# Patient Record
Sex: MALE | Race: WHITE | NOT HISPANIC OR LATINO | Employment: FULL TIME | URBAN - METROPOLITAN AREA
[De-identification: names, ages, dates, MRNs, and addresses within clinical notes are randomized per-mention and may not be internally consistent; named-entity substitution may affect disease eponyms.]

---

## 2023-09-19 PROBLEM — E03.9 HYPOTHYROIDISM: Status: ACTIVE | Noted: 2023-09-19

## 2023-09-19 PROBLEM — G25.81 RLS (RESTLESS LEGS SYNDROME): Status: ACTIVE | Noted: 2023-09-19

## 2023-09-19 PROBLEM — E55.9 VITAMIN D DEFICIENCY: Status: ACTIVE | Noted: 2023-09-19

## 2023-09-19 PROBLEM — G47.33 OSA ON CPAP: Status: ACTIVE | Noted: 2023-09-19

## 2023-09-19 PROBLEM — Z86.73 HISTORY OF CVA (CEREBROVASCULAR ACCIDENT): Status: ACTIVE | Noted: 2023-09-19

## 2023-09-19 PROBLEM — G31.84 MILD COGNITIVE IMPAIRMENT: Status: ACTIVE | Noted: 2023-09-19

## 2023-09-19 PROBLEM — F45.8 BRUXISM (TEETH GRINDING): Status: ACTIVE | Noted: 2023-09-19

## 2023-09-19 PROBLEM — E78.5 HYPERLIPIDEMIA: Status: ACTIVE | Noted: 2023-09-19

## 2023-09-19 PROBLEM — E11.42 TYPE 2 DIABETES MELLITUS WITH DIABETIC POLYNEUROPATHY (MULTI): Status: ACTIVE | Noted: 2023-09-19

## 2023-09-19 PROBLEM — F41.8 DEPRESSION WITH ANXIETY: Status: ACTIVE | Noted: 2023-09-19

## 2023-09-19 PROBLEM — N52.1 ERECTILE DYSFUNCTION DUE TO DISEASES CLASSIFIED ELSEWHERE: Status: ACTIVE | Noted: 2023-09-19

## 2023-09-19 PROBLEM — G47.33 SEVERE OBSTRUCTIVE SLEEP APNEA-HYPOPNEA SYNDROME: Status: ACTIVE | Noted: 2023-09-19

## 2023-09-19 PROBLEM — I10 BENIGN ESSENTIAL HYPERTENSION: Status: ACTIVE | Noted: 2023-09-19

## 2023-09-19 PROBLEM — E78.2 COMBINED HYPERLIPIDEMIA: Status: ACTIVE | Noted: 2023-09-19

## 2023-09-19 PROBLEM — E11.65 TYPE 2 DIABETES MELLITUS WITH HYPERGLYCEMIA (MULTI): Status: ACTIVE | Noted: 2023-09-19

## 2023-09-19 PROBLEM — E66.9 OBESE: Status: ACTIVE | Noted: 2023-09-19

## 2023-09-19 PROBLEM — G47.33 OBSTRUCTIVE SLEEP APNEA (ADULT) (PEDIATRIC): Status: ACTIVE | Noted: 2023-09-19

## 2023-09-19 PROBLEM — E11.42 TYPE 2 DIABETES MELLITUS WITH DIABETIC POLYNEUROPATHY, WITHOUT LONG-TERM CURRENT USE OF INSULIN (MULTI): Status: ACTIVE | Noted: 2023-09-19

## 2023-09-19 PROBLEM — K21.9 GERD WITHOUT ESOPHAGITIS: Status: ACTIVE | Noted: 2023-09-19

## 2023-09-19 PROBLEM — E83.19 OTHER DISORDERS OF IRON METABOLISM: Status: ACTIVE | Noted: 2023-09-19

## 2023-09-19 RX ORDER — METOPROLOL TARTRATE 50 MG/1
1 TABLET ORAL 2 TIMES DAILY
COMMUNITY
Start: 2013-07-22 | End: 2024-02-05 | Stop reason: WASHOUT

## 2023-09-19 RX ORDER — PEN NEEDLE, DIABETIC 30 GX3/16"
NEEDLE, DISPOSABLE MISCELLANEOUS 4 TIMES DAILY
COMMUNITY
Start: 2022-01-14

## 2023-09-19 RX ORDER — INSULIN DEGLUDEC 200 U/ML
50 INJECTION, SOLUTION SUBCUTANEOUS DAILY
COMMUNITY
End: 2024-02-05 | Stop reason: WASHOUT

## 2023-09-19 RX ORDER — SERTRALINE HYDROCHLORIDE 100 MG/1
100 TABLET, FILM COATED ORAL 2 TIMES DAILY
COMMUNITY
End: 2024-02-05 | Stop reason: WASHOUT

## 2023-09-19 RX ORDER — ATORVASTATIN CALCIUM 40 MG/1
40 TABLET, FILM COATED ORAL DAILY
COMMUNITY
End: 2024-02-05 | Stop reason: WASHOUT

## 2023-09-19 RX ORDER — PIOGLITAZONEHYDROCHLORIDE 15 MG/1
15 TABLET ORAL DAILY
COMMUNITY
End: 2024-02-05 | Stop reason: WASHOUT

## 2023-09-19 RX ORDER — LOSARTAN POTASSIUM AND HYDROCHLOROTHIAZIDE 25; 100 MG/1; MG/1
1 TABLET ORAL DAILY
COMMUNITY
Start: 2014-02-17 | End: 2023-11-19 | Stop reason: WASHOUT

## 2023-09-19 RX ORDER — LEVOTHYROXINE SODIUM 200 UG/1
1 TABLET ORAL DAILY
COMMUNITY
Start: 2013-07-22 | End: 2024-02-05 | Stop reason: WASHOUT

## 2023-09-19 RX ORDER — SYRINGE,SAFETY WITH NEEDLE,1ML 25GX1"
SYRINGE (EA) MISCELLANEOUS 2 TIMES DAILY
COMMUNITY
Start: 2014-04-08

## 2023-09-19 RX ORDER — MELOXICAM 15 MG/1
15 TABLET ORAL DAILY
COMMUNITY
End: 2023-11-19 | Stop reason: SDUPTHER

## 2023-09-19 RX ORDER — METOPROLOL TARTRATE 100 MG/1
100 TABLET ORAL 2 TIMES DAILY
COMMUNITY
End: 2024-02-05 | Stop reason: WASHOUT

## 2023-09-19 RX ORDER — SILDENAFIL 100 MG/1
100 TABLET, FILM COATED ORAL DAILY PRN
COMMUNITY
Start: 2023-03-07

## 2023-09-19 RX ORDER — INSULIN ASPART 100 [IU]/ML
INJECTION, SOLUTION INTRAVENOUS; SUBCUTANEOUS DAILY
COMMUNITY
Start: 2022-01-14 | End: 2024-02-05 | Stop reason: WASHOUT

## 2023-09-19 RX ORDER — HUMAN INSULIN 100 [USP'U]/ML
INJECTION, SUSPENSION SUBCUTANEOUS 2 TIMES DAILY
COMMUNITY
Start: 2016-03-31 | End: 2024-02-05 | Stop reason: WASHOUT

## 2023-09-19 RX ORDER — FAMOTIDINE 40 MG/1
40 TABLET, FILM COATED ORAL
COMMUNITY
Start: 2014-03-25 | End: 2024-02-05 | Stop reason: WASHOUT

## 2023-09-19 RX ORDER — FLUTICASONE PROPIONATE 50 MCG
2 SPRAY, SUSPENSION (ML) NASAL DAILY
COMMUNITY
Start: 2015-10-27 | End: 2024-02-05 | Stop reason: WASHOUT

## 2023-09-19 RX ORDER — GABAPENTIN 300 MG/1
1 CAPSULE ORAL NIGHTLY
COMMUNITY
Start: 2015-10-27 | End: 2024-02-05 | Stop reason: WASHOUT

## 2023-09-19 RX ORDER — GABAPENTIN 100 MG/1
200 CAPSULE ORAL 3 TIMES DAILY PRN
COMMUNITY
End: 2024-02-05 | Stop reason: WASHOUT

## 2023-09-19 RX ORDER — LORATADINE 10 MG/1
10 TABLET ORAL
COMMUNITY
Start: 2014-04-02 | End: 2024-02-05 | Stop reason: WASHOUT

## 2023-09-19 RX ORDER — DEXTROSE 4 G
TABLET,CHEWABLE ORAL
COMMUNITY
Start: 2021-02-25 | End: 2024-02-05 | Stop reason: WASHOUT

## 2023-09-19 RX ORDER — AMLODIPINE BESYLATE 10 MG/1
1 TABLET ORAL DAILY
COMMUNITY
End: 2024-02-05 | Stop reason: WASHOUT

## 2023-09-19 RX ORDER — LOSARTAN POTASSIUM 100 MG/1
1 TABLET ORAL DAILY
COMMUNITY
End: 2023-11-19 | Stop reason: SDUPTHER

## 2023-09-19 RX ORDER — OMEPRAZOLE 40 MG/1
40 CAPSULE, DELAYED RELEASE ORAL
COMMUNITY
End: 2024-02-05 | Stop reason: WASHOUT

## 2023-09-19 RX ORDER — LANCETS 33 GAUGE
EACH MISCELLANEOUS
COMMUNITY
Start: 2021-02-25

## 2023-09-19 RX ORDER — HYDROCHLOROTHIAZIDE 25 MG/1
25 TABLET ORAL DAILY
COMMUNITY
End: 2024-02-05 | Stop reason: WASHOUT

## 2023-09-19 RX ORDER — HYDRALAZINE HYDROCHLORIDE 100 MG/1
1 TABLET, FILM COATED ORAL 2 TIMES DAILY
COMMUNITY
End: 2024-02-05 | Stop reason: WASHOUT

## 2023-09-19 RX ORDER — GABAPENTIN 800 MG/1
100 TABLET ORAL 2 TIMES DAILY
COMMUNITY
End: 2024-02-05 | Stop reason: WASHOUT

## 2023-11-02 ENCOUNTER — PHARMACY VISIT (OUTPATIENT)
Dept: PHARMACY | Facility: CLINIC | Age: 63
End: 2023-11-02
Payer: COMMERCIAL

## 2023-11-16 ENCOUNTER — PHARMACY VISIT (OUTPATIENT)
Dept: PHARMACY | Facility: CLINIC | Age: 63
End: 2023-11-16
Payer: COMMERCIAL

## 2023-11-16 DIAGNOSIS — E11.65 TYPE 2 DIABETES MELLITUS WITH HYPERGLYCEMIA, WITH LONG-TERM CURRENT USE OF INSULIN (MULTI): Primary | ICD-10-CM

## 2023-11-16 DIAGNOSIS — Z79.4 TYPE 2 DIABETES MELLITUS WITH HYPERGLYCEMIA, WITH LONG-TERM CURRENT USE OF INSULIN (MULTI): Primary | ICD-10-CM

## 2023-11-16 PROCEDURE — RXMED WILLOW AMBULATORY MEDICATION CHARGE

## 2023-11-17 ENCOUNTER — PHARMACY VISIT (OUTPATIENT)
Dept: PHARMACY | Facility: CLINIC | Age: 63
End: 2023-11-17
Payer: COMMERCIAL

## 2023-11-17 ENCOUNTER — TELEPHONE (OUTPATIENT)
Dept: PRIMARY CARE | Facility: CLINIC | Age: 63
End: 2023-11-17
Payer: MEDICARE

## 2023-11-17 DIAGNOSIS — M15.9 GENERALIZED OSTEOARTHRITIS OF MULTIPLE SITES: ICD-10-CM

## 2023-11-17 DIAGNOSIS — I10 BENIGN ESSENTIAL HYPERTENSION: Primary | ICD-10-CM

## 2023-11-17 PROCEDURE — RXMED WILLOW AMBULATORY MEDICATION CHARGE

## 2023-11-17 RX ORDER — INSULIN ASPART 100 [IU]/ML
INJECTION, SOLUTION INTRAVENOUS; SUBCUTANEOUS
Qty: 45 ML | Refills: 1 | Status: SHIPPED | OUTPATIENT
Start: 2023-11-17 | End: 2024-11-16

## 2023-11-17 NOTE — TELEPHONE ENCOUNTER
Pt called to request RX refills of the following to be sent to [facility] pharmacy:    Meloxicam, 15 mg  Losartan, 100 mg

## 2023-11-19 ENCOUNTER — PHARMACY VISIT (OUTPATIENT)
Dept: PHARMACY | Facility: CLINIC | Age: 63
End: 2023-11-19
Payer: COMMERCIAL

## 2023-11-19 PROBLEM — M15.9 GENERALIZED OSTEOARTHRITIS OF MULTIPLE SITES: Status: ACTIVE | Noted: 2023-11-19

## 2023-11-19 PROCEDURE — RXMED WILLOW AMBULATORY MEDICATION CHARGE

## 2023-11-19 RX ORDER — LOSARTAN POTASSIUM 100 MG/1
100 TABLET ORAL DAILY
Qty: 90 TABLET | Refills: 1 | Status: SHIPPED | OUTPATIENT
Start: 2023-11-19

## 2023-11-19 RX ORDER — MELOXICAM 15 MG/1
15 TABLET ORAL DAILY
Qty: 90 TABLET | Refills: 1 | Status: SHIPPED | OUTPATIENT
Start: 2023-11-19

## 2023-12-18 DIAGNOSIS — E11.65 TYPE 2 DIABETES MELLITUS WITH HYPERGLYCEMIA, WITH LONG-TERM CURRENT USE OF INSULIN (MULTI): Primary | ICD-10-CM

## 2023-12-18 DIAGNOSIS — Z79.4 TYPE 2 DIABETES MELLITUS WITH HYPERGLYCEMIA, WITH LONG-TERM CURRENT USE OF INSULIN (MULTI): Primary | ICD-10-CM

## 2023-12-18 PROCEDURE — RXMED WILLOW AMBULATORY MEDICATION CHARGE

## 2023-12-18 RX ORDER — PIOGLITAZONEHYDROCHLORIDE 15 MG/1
15 TABLET ORAL DAILY
Qty: 90 TABLET | Refills: 1 | Status: SHIPPED | OUTPATIENT
Start: 2023-12-18 | End: 2024-12-17

## 2023-12-21 ENCOUNTER — PHARMACY VISIT (OUTPATIENT)
Dept: PHARMACY | Facility: CLINIC | Age: 63
End: 2023-12-21
Payer: COMMERCIAL

## 2024-01-18 ENCOUNTER — APPOINTMENT (OUTPATIENT)
Dept: ENDOCRINOLOGY | Facility: CLINIC | Age: 64
End: 2024-01-18
Payer: MEDICARE

## 2024-01-26 ENCOUNTER — APPOINTMENT (OUTPATIENT)
Dept: ENDOCRINOLOGY | Facility: CLINIC | Age: 64
End: 2024-01-26
Payer: MEDICARE

## 2024-02-05 ENCOUNTER — OFFICE VISIT (OUTPATIENT)
Dept: ENDOCRINOLOGY | Facility: CLINIC | Age: 64
End: 2024-02-05
Payer: MEDICARE

## 2024-02-05 VITALS
SYSTOLIC BLOOD PRESSURE: 151 MMHG | HEART RATE: 62 BPM | BODY MASS INDEX: 35.65 KG/M2 | WEIGHT: 252 LBS | DIASTOLIC BLOOD PRESSURE: 91 MMHG

## 2024-02-05 DIAGNOSIS — E06.3 HYPOTHYROIDISM DUE TO HASHIMOTO'S THYROIDITIS: ICD-10-CM

## 2024-02-05 DIAGNOSIS — I10 BENIGN ESSENTIAL HYPERTENSION: ICD-10-CM

## 2024-02-05 DIAGNOSIS — E11.65 TYPE 2 DIABETES MELLITUS WITH HYPERGLYCEMIA, WITH LONG-TERM CURRENT USE OF INSULIN (MULTI): Primary | ICD-10-CM

## 2024-02-05 DIAGNOSIS — Z79.4 TYPE 2 DIABETES MELLITUS WITH HYPERGLYCEMIA, WITH LONG-TERM CURRENT USE OF INSULIN (MULTI): Primary | ICD-10-CM

## 2024-02-05 DIAGNOSIS — E03.8 HYPOTHYROIDISM DUE TO HASHIMOTO'S THYROIDITIS: ICD-10-CM

## 2024-02-05 DIAGNOSIS — E78.2 MIXED HYPERLIPIDEMIA: ICD-10-CM

## 2024-02-05 LAB — POC HEMOGLOBIN A1C: 8 % (ref 4.2–6.5)

## 2024-02-05 PROCEDURE — 3077F SYST BP >= 140 MM HG: CPT | Performed by: NURSE PRACTITIONER

## 2024-02-05 PROCEDURE — 1159F MED LIST DOCD IN RCRD: CPT | Performed by: NURSE PRACTITIONER

## 2024-02-05 PROCEDURE — 1126F AMNT PAIN NOTED NONE PRSNT: CPT | Performed by: NURSE PRACTITIONER

## 2024-02-05 PROCEDURE — 83036 HEMOGLOBIN GLYCOSYLATED A1C: CPT | Performed by: NURSE PRACTITIONER

## 2024-02-05 PROCEDURE — 1160F RVW MEDS BY RX/DR IN RCRD: CPT | Performed by: NURSE PRACTITIONER

## 2024-02-05 PROCEDURE — 1036F TOBACCO NON-USER: CPT | Performed by: NURSE PRACTITIONER

## 2024-02-05 PROCEDURE — 99214 OFFICE O/P EST MOD 30 MIN: CPT | Performed by: NURSE PRACTITIONER

## 2024-02-05 PROCEDURE — 4010F ACE/ARB THERAPY RXD/TAKEN: CPT | Performed by: NURSE PRACTITIONER

## 2024-02-05 PROCEDURE — 3080F DIAST BP >= 90 MM HG: CPT | Performed by: NURSE PRACTITIONER

## 2024-02-05 PROCEDURE — 95251 CONT GLUC MNTR ANALYSIS I&R: CPT | Performed by: NURSE PRACTITIONER

## 2024-02-05 ASSESSMENT — PATIENT HEALTH QUESTIONNAIRE - PHQ9
2. FEELING DOWN, DEPRESSED OR HOPELESS: SEVERAL DAYS
1. LITTLE INTEREST OR PLEASURE IN DOING THINGS: NOT AT ALL
2. FEELING DOWN, DEPRESSED OR HOPELESS: NOT AT ALL
SUM OF ALL RESPONSES TO PHQ9 QUESTIONS 1 & 2: 0

## 2024-02-05 ASSESSMENT — PAIN SCALES - GENERAL: PAINLEVEL: 0-NO PAIN

## 2024-02-05 NOTE — PROGRESS NOTES
HPI   Presents for follow up/metabolic management 62 yo with DM Type 2 diagnosed in his 40s. History Hypothyroidism, taking Levothyroxine 200 mcg, RAFAEL, HTN, HLD, CVA. Current A1C 8.0% was 7.8 %. Patient testing sugars 4 times a day. Following carb controlled diet somewhat and know reasonable carb allowances. Patient able to afford their medications. Patient is exercising.         -CGM Lenore 2 with phone christiano. Currently on insulin checking BS at least 4 xs a day adjustments made based on readings/frequent visits to manage.         -INSULIN Tresiba 34 Novolog 5/5/10 ISF 25>125          -Metformin intolerant GLP1 history pancreatitis, SGLT2 no TZD Kush 15mg         -HTN Losartan, HCTZ, Metoprolol, Hydralizine, Amlodipine daily at goal         -STATIN Atorvastatin LDLCALC 55    14 day Lenore report downloaded and attached, time in target 57% no lows avg glucose 172. Waking -150, going into bed mostly in target. Post prandial spikes. Under a lot of stress lately with work. Has been off track with dosing, was out of sensors for a while.        Current Outpatient Medications:     amLODIPine (Norvasc) 10 mg tablet, TAKE 1 TABLET BY MOUTH ONE TIME DAILY, Disp: 90 tablet, Rfl: 1    aspirin (ASPIR-81 ORAL), Take 1 tablet by mouth once daily., Disp: , Rfl:     atorvastatin (Lipitor) 40 mg tablet, TAKE 1 TABLET BY MOUTH ONE TIME DAILY, Disp: 90 tablet, Rfl: 1    blood sugar diagnostic strip, TEST BLOOD SUGAR THREE TIMES A DAY AS DIRECTED, Disp: 300 each, Rfl: 1    blood-glucose meter misc, USE FOUR TIMES A DAY AS DIRECTED., Disp: 1 each, Rfl: 0    FreeStyle Lenore sensor system kit, USE AS DIRECTED EVERY 14 DAYS, Disp: 6 each, Rfl: 1    hydrALAZINE (Apresoline) 100 mg tablet, TAKE 1 TABLET BY MOUTH TWO TIMES A DAY, Disp: 180 tablet, Rfl: 1    hydroCHLOROthiazide (HYDRODiuril) 25 mg tablet, TAKE 1 TABLET BY MOUTH ONE TIME DAILY IN THE MORNING, Disp: 90 tablet, Rfl: 1    insulin aspart (NovoLOG Flexpen U-100 Insulin) 100 unit/mL  "(3 mL) pen, INJECT SUBCUTANEOUSLY BEFORE MEALS UP TO 50 UNITS DAILY, Disp: 45 mL, Rfl: 1    insulin degludec (Tresiba FlexTouch) 200 unit/mL (3 mL) injection, INJECT SUBCUTANEOUSLY UP TO 50 UNITS DAILY AS DIRECTED, Disp: 27 mL, Rfl: 3    insulin syringe-needle U-100 31G X 5/16\" 1 mL syringe, 2 times a day., Disp: , Rfl:     lancets 33 gauge misc, as directed intradermally three times per day for 90 days, Disp: , Rfl:     lancets misc, USE AS DIRECTED THREE TIMES A DAY., Disp: 300 each, Rfl: 1    levothyroxine (Synthroid, Levoxyl) 200 mcg tablet, TAKE 1 TABLET BY MOUTH ONE TIME DAILY IN THE MORNING ON AN EMPTY STOMACH, Disp: 90 tablet, Rfl: 1    losartan (Cozaar) 100 mg tablet, Take 1 tablet (100 mg) by mouth once daily., Disp: 90 tablet, Rfl: 1    meloxicam (Mobic) 15 mg tablet, Take 1 tablet (15 mg) by mouth once daily., Disp: 90 tablet, Rfl: 1    metoprolol tartrate (Lopressor) 100 mg tablet, TAKE 1 TABLET BY MOUTH TWO TIMES A DAY, Disp: 180 tablet, Rfl: 1    omeprazole (PriLOSEC) 40 mg DR capsule, TAKE 1 CAPSULE BY MOUTH ONE TIME DAILY 30 MINUTES BEFORE BREAKFAST, Disp: 90 capsule, Rfl: 1    pen needle, diabetic 32 gauge x 5/32\" needle, 4 times a day. As directed, Disp: , Rfl:     pioglitazone (Actos) 15 mg tablet, TAKE 1 TABLET BY MOUTH ONE TIME DAILY., Disp: 90 tablet, Rfl: 1    sertraline (Zoloft) 100 mg tablet, TAKE 1 TABLET BY MOUTH TWO TIMES A DAY, Disp: 180 tablet, Rfl: 1    sildenafil (Viagra) 100 mg tablet, Take 1 tablet (100 mg) by mouth once daily as needed., Disp: , Rfl:       Allergies as of 02/05/2024 - Reviewed 02/05/2024   Allergen Reaction Noted    Ace inhibitors Cough 09/19/2023    Lisinopril Cough 09/19/2023    Metformin Diarrhea 09/19/2023    Ampicillin Rash and Hives 09/19/2023         Review of Systems   Cardiology: Lightheadedness-denies.  Chest pain-denies.  Leg edema-denies.  Palpitations-denies.  Respiratory: Cough-denies. Shortness of breath-denies.  Wheezing-denies.  Gastroenterology: " Constipation-denies.  Diarrhea-denies.  Heartburn-denies.  Endocrinology: Cold intolerance-denies.  Heat intolerance-denies.  Sweats-denies.  Neurology: Headache-denies.  Tremor-denies.  Neuropathy in extremities-denies.  Psychology: Low energy-denies.  Irritability-denies.  Sleep disturbances-denies.      BP (!) 151/91 (BP Location: Left arm, Patient Position: Sitting)   Pulse 62   Wt 114 kg (252 lb)   BMI 35.65 kg/m²       Labs:  Lab Results   Component Value Date    WBC 8.4 04/04/2023    NRBC 0 04/04/2023    RBC 4.84 04/04/2023    HGB 13.9 04/04/2023    HCT 41.4 04/04/2023     04/04/2023     Lab Results   Component Value Date    CALCIUM 9.9 04/04/2023    AST 19 04/04/2023    ALKPHOS 115 04/04/2023    BILITOT 0.3 04/04/2023    PROT 6.9 04/04/2023    ALBUMIN 4.3 04/04/2023    GLOB 2.6 04/04/2023    AGR 1.7 04/04/2023     04/04/2023    K 3.5 04/04/2023     04/04/2023    CO2 27 04/04/2023    ANIONGAP 11 04/04/2023    BUN 27 (H) 04/04/2023    CREATININE 1.3 04/04/2023    UREACREAUR 20.8 04/04/2023    GLUCOSE 299 (H) 04/04/2023    ALT 21 04/04/2023    EGFR 61 04/04/2023     Lab Results   Component Value Date    CHOL 126 (L) 04/04/2023    TRIG 173 (H) 04/04/2023    HDL 38 (L) 04/04/2023    LDLCALC 53 (L) 04/04/2023     Lab Results   Component Value Date    TSH 2.50 04/04/2023     Lab Results   Component Value Date    HGBA1C 8.0 (A) 02/05/2024         Physical Exam   General Appearance: pleasant, cooperative, no acute distress  HEENT: no chemosis, no proptosis, no lid lag, no lid retraction  Neck: no lymphadenopathy, no thyromegaly, no dominant thyroid nodules  Heart: no murmurs, regular rate and rhythm, S1 and S2  Lungs: no wheezes, no rhonci, no rales  Extremities: no lower extremity swelling      Assessment/Plan   1. Type 2 diabetes mellitus with hyperglycemia, with long-term current use of insulin (CMS/[facility])  -work/life stressors not watching carbs   -was out of sensors for a while  -reviewed  target bs  -reviewed recommended carbs per meal/snack    - POCT glycosylated hemoglobin (Hb A1C) manually resulted    2. Mixed hyperlipidemia  -at target     3. Hypothyroidism due to Hashimoto's thyroiditis  -euthyroid  -no compressive/obstructive neck complaints    4. Benign essential hypertension  -above target  -did not take meds  -will check and call office if not 140/80 or below     Follow Up: 3 months CNP      -labs/tests/notes reviewed  -reviewed and counseled patient on medication monitoring and side effects  Medical Decision Making  Complexity of problem: Chronic illness of diabetes mellitus uncontrolled, progressing  Data analyzed and reviewed: Reviewed prior notes, blood glucose data, labs including HgbA1c, lipids, serum chemistries.  Ordered tests.   Risk of complications and morbidities: Is definite because of use of insulin and risk of hypoglycemia.  Prescription medications reviewed and modifications made.  Compliance assessed.  Addressed social determinants of health including food insecurity.

## 2024-02-05 NOTE — PATIENT INSTRUCTIONS
BP TARGET 140/80 OR BELOW   Target blood sugars are 100-130 when waking, and under 180 two hours after a meal and before bedtime.     Increase long acting insulin by 2 units every 3 days for morning blood sugar 100-130

## 2024-02-12 ENCOUNTER — PHARMACY VISIT (OUTPATIENT)
Dept: PHARMACY | Facility: CLINIC | Age: 64
End: 2024-02-12
Payer: COMMERCIAL

## 2024-02-12 DIAGNOSIS — E78.2 COMBINED HYPERLIPIDEMIA: Primary | ICD-10-CM

## 2024-02-12 PROCEDURE — RXMED WILLOW AMBULATORY MEDICATION CHARGE

## 2024-02-12 RX ORDER — ATORVASTATIN CALCIUM 40 MG/1
40 TABLET, FILM COATED ORAL DAILY
Qty: 90 TABLET | Refills: 1 | Status: SHIPPED | OUTPATIENT
Start: 2024-02-12 | End: 2025-02-11

## 2024-02-20 ENCOUNTER — TELEPHONE (OUTPATIENT)
Dept: ENDOCRINOLOGY | Facility: CLINIC | Age: 64
End: 2024-02-20
Payer: MEDICARE

## 2024-02-20 NOTE — TELEPHONE ENCOUNTER
Malcom MICHELLE Namita   1960   [id number]   [phone]        MA called patient twice to cancel and reschedule 5/6/24 appt due to provider is not available. A letter was mailed out to patient to call office to reschedule appt.

## 2024-03-13 DIAGNOSIS — I10 BENIGN ESSENTIAL HYPERTENSION: Primary | ICD-10-CM

## 2024-03-13 DIAGNOSIS — K21.9 GERD WITHOUT ESOPHAGITIS: ICD-10-CM

## 2024-03-13 DIAGNOSIS — F41.8 DEPRESSION WITH ANXIETY: ICD-10-CM

## 2024-03-13 DIAGNOSIS — E03.9 HYPOTHYROIDISM, UNSPECIFIED TYPE: ICD-10-CM

## 2024-03-13 PROCEDURE — RXMED WILLOW AMBULATORY MEDICATION CHARGE

## 2024-03-13 RX ORDER — HYDRALAZINE HYDROCHLORIDE 100 MG/1
100 TABLET, FILM COATED ORAL 2 TIMES DAILY
Qty: 180 TABLET | Refills: 1 | Status: SHIPPED | OUTPATIENT
Start: 2024-03-13 | End: 2025-03-13

## 2024-03-13 RX ORDER — LEVOTHYROXINE SODIUM 200 UG/1
TABLET ORAL
Qty: 90 TABLET | Refills: 1 | Status: SHIPPED | OUTPATIENT
Start: 2024-03-13 | End: 2025-03-13

## 2024-03-13 RX ORDER — SERTRALINE HYDROCHLORIDE 100 MG/1
100 TABLET, FILM COATED ORAL 2 TIMES DAILY
Qty: 180 TABLET | Refills: 1 | Status: SHIPPED | OUTPATIENT
Start: 2024-03-13 | End: 2025-03-13

## 2024-03-13 RX ORDER — METOPROLOL TARTRATE 100 MG/1
100 TABLET ORAL 2 TIMES DAILY
Qty: 180 TABLET | Refills: 1 | Status: SHIPPED | OUTPATIENT
Start: 2024-03-13 | End: 2025-03-13

## 2024-03-13 RX ORDER — HYDROCHLOROTHIAZIDE 25 MG/1
25 TABLET ORAL EVERY MORNING
Qty: 90 TABLET | Refills: 1 | Status: SHIPPED | OUTPATIENT
Start: 2024-03-13 | End: 2025-03-13

## 2024-03-13 RX ORDER — OMEPRAZOLE 40 MG/1
CAPSULE, DELAYED RELEASE ORAL
Qty: 90 CAPSULE | Refills: 1 | Status: SHIPPED | OUTPATIENT
Start: 2024-03-13 | End: 2025-03-13

## 2024-03-22 ENCOUNTER — PHARMACY VISIT (OUTPATIENT)
Dept: PHARMACY | Facility: CLINIC | Age: 64
End: 2024-03-22
Payer: COMMERCIAL

## 2024-04-05 ENCOUNTER — APPOINTMENT (OUTPATIENT)
Dept: PRIMARY CARE | Facility: CLINIC | Age: 64
End: 2024-04-05
Payer: MEDICARE

## 2024-04-10 ENCOUNTER — LAB (OUTPATIENT)
Dept: LAB | Facility: LAB | Age: 64
End: 2024-04-10
Payer: MEDICARE

## 2024-04-10 DIAGNOSIS — Z86.73 PERSONAL HISTORY OF TRANSIENT ISCHEMIC ATTACK (TIA), AND CEREBRAL INFARCTION WITHOUT RESIDUAL DEFICITS: ICD-10-CM

## 2024-04-10 DIAGNOSIS — Z00.00 ENCOUNTER FOR GENERAL ADULT MEDICAL EXAMINATION WITHOUT ABNORMAL FINDINGS: Primary | ICD-10-CM

## 2024-04-10 DIAGNOSIS — E03.9 HYPOTHYROIDISM, UNSPECIFIED: ICD-10-CM

## 2024-04-10 DIAGNOSIS — E11.65 TYPE 2 DIABETES MELLITUS WITH HYPERGLYCEMIA (MULTI): ICD-10-CM

## 2024-04-10 DIAGNOSIS — Z79.4 LONG TERM (CURRENT) USE OF INSULIN (MULTI): ICD-10-CM

## 2024-04-10 DIAGNOSIS — I10 ESSENTIAL (PRIMARY) HYPERTENSION: ICD-10-CM

## 2024-04-10 LAB
ALBUMIN SERPL BCP-MCNC: 4.4 G/DL (ref 3.4–5)
ALP SERPL-CCNC: 99 U/L (ref 33–136)
ALT SERPL W P-5'-P-CCNC: 13 U/L (ref 10–52)
ANION GAP SERPL CALC-SCNC: 13 MMOL/L (ref 10–20)
AST SERPL W P-5'-P-CCNC: 16 U/L (ref 9–39)
BASOPHILS # BLD AUTO: 0.05 X10*3/UL (ref 0–0.1)
BASOPHILS NFR BLD AUTO: 0.6 %
BILIRUB SERPL-MCNC: 0.5 MG/DL (ref 0–1.2)
BUN SERPL-MCNC: 31 MG/DL (ref 6–23)
CALCIUM SERPL-MCNC: 10.7 MG/DL (ref 8.6–10.3)
CHLORIDE SERPL-SCNC: 103 MMOL/L (ref 98–107)
CHOLEST SERPL-MCNC: 128 MG/DL (ref 0–199)
CHOLESTEROL/HDL RATIO: 3.4
CO2 SERPL-SCNC: 29 MMOL/L (ref 21–32)
CREAT SERPL-MCNC: 1.65 MG/DL (ref 0.5–1.3)
EGFRCR SERPLBLD CKD-EPI 2021: 46 ML/MIN/1.73M*2
EOSINOPHIL # BLD AUTO: 0.25 X10*3/UL (ref 0–0.7)
EOSINOPHIL NFR BLD AUTO: 3.1 %
ERYTHROCYTE [DISTWIDTH] IN BLOOD BY AUTOMATED COUNT: 13.3 % (ref 11.5–14.5)
GLUCOSE SERPL-MCNC: 69 MG/DL (ref 74–99)
HCT VFR BLD AUTO: 42 % (ref 41–52)
HDLC SERPL-MCNC: 37.7 MG/DL
HGB BLD-MCNC: 13.7 G/DL (ref 13.5–17.5)
IMM GRANULOCYTES # BLD AUTO: 0.03 X10*3/UL (ref 0–0.7)
IMM GRANULOCYTES NFR BLD AUTO: 0.4 % (ref 0–0.9)
LDLC SERPL CALC-MCNC: 68 MG/DL
LYMPHOCYTES # BLD AUTO: 1.93 X10*3/UL (ref 1.2–4.8)
LYMPHOCYTES NFR BLD AUTO: 23.8 %
MCH RBC QN AUTO: 28.3 PG (ref 26–34)
MCHC RBC AUTO-ENTMCNC: 32.6 G/DL (ref 32–36)
MCV RBC AUTO: 87 FL (ref 80–100)
MONOCYTES # BLD AUTO: 0.6 X10*3/UL (ref 0.1–1)
MONOCYTES NFR BLD AUTO: 7.4 %
NEUTROPHILS # BLD AUTO: 5.26 X10*3/UL (ref 1.2–7.7)
NEUTROPHILS NFR BLD AUTO: 64.7 %
NON HDL CHOLESTEROL: 90 MG/DL (ref 0–149)
NRBC BLD-RTO: 0 /100 WBCS (ref 0–0)
PLATELET # BLD AUTO: 242 X10*3/UL (ref 150–450)
POTASSIUM SERPL-SCNC: 4.1 MMOL/L (ref 3.5–5.3)
PROT SERPL-MCNC: 6.8 G/DL (ref 6.4–8.2)
PSA SERPL-MCNC: 0.45 NG/ML
RBC # BLD AUTO: 4.84 X10*6/UL (ref 4.5–5.9)
SODIUM SERPL-SCNC: 141 MMOL/L (ref 136–145)
T4 FREE SERPL-MCNC: 0.91 NG/DL (ref 0.61–1.12)
TRIGL SERPL-MCNC: 112 MG/DL (ref 0–149)
TSH SERPL-ACNC: 6.31 MIU/L (ref 0.44–3.98)
VLDL: 22 MG/DL (ref 0–40)
WBC # BLD AUTO: 8.1 X10*3/UL (ref 4.4–11.3)

## 2024-04-10 PROCEDURE — 84153 ASSAY OF PSA TOTAL: CPT

## 2024-04-10 PROCEDURE — 36415 COLL VENOUS BLD VENIPUNCTURE: CPT

## 2024-04-11 ENCOUNTER — APPOINTMENT (OUTPATIENT)
Dept: PRIMARY CARE | Facility: CLINIC | Age: 64
End: 2024-04-11
Payer: MEDICARE

## 2024-04-11 DIAGNOSIS — I10 BENIGN ESSENTIAL HYPERTENSION: ICD-10-CM

## 2024-04-11 DIAGNOSIS — N17.9 ACUTE RENAL INJURY (CMS-HCC): ICD-10-CM

## 2024-04-11 DIAGNOSIS — R79.89 ELEVATED TSH: Primary | ICD-10-CM

## 2024-04-26 PROCEDURE — RXMED WILLOW AMBULATORY MEDICATION CHARGE

## 2024-04-29 ENCOUNTER — PHARMACY VISIT (OUTPATIENT)
Dept: PHARMACY | Facility: CLINIC | Age: 64
End: 2024-04-29
Payer: COMMERCIAL

## 2024-05-06 ENCOUNTER — APPOINTMENT (OUTPATIENT)
Dept: ENDOCRINOLOGY | Facility: CLINIC | Age: 64
End: 2024-05-06
Payer: MEDICARE

## 2024-05-10 ENCOUNTER — LAB (OUTPATIENT)
Dept: LAB | Facility: LAB | Age: 64
End: 2024-05-10
Payer: MEDICARE

## 2024-05-10 DIAGNOSIS — N17.9 ACUTE RENAL INJURY (CMS-HCC): ICD-10-CM

## 2024-05-10 DIAGNOSIS — I10 BENIGN ESSENTIAL HYPERTENSION: ICD-10-CM

## 2024-05-10 DIAGNOSIS — R79.89 ELEVATED TSH: ICD-10-CM

## 2024-05-10 LAB
ANION GAP SERPL CALC-SCNC: 14 MMOL/L (ref 10–20)
BUN SERPL-MCNC: 31 MG/DL (ref 6–23)
CALCIUM SERPL-MCNC: 10.2 MG/DL (ref 8.6–10.3)
CHLORIDE SERPL-SCNC: 102 MMOL/L (ref 98–107)
CO2 SERPL-SCNC: 28 MMOL/L (ref 21–32)
CREAT SERPL-MCNC: 1.16 MG/DL (ref 0.5–1.3)
EGFRCR SERPLBLD CKD-EPI 2021: 69 ML/MIN/1.73M*2
GLUCOSE SERPL-MCNC: 144 MG/DL (ref 74–99)
POTASSIUM SERPL-SCNC: 3.9 MMOL/L (ref 3.5–5.3)
SODIUM SERPL-SCNC: 140 MMOL/L (ref 136–145)
TSH SERPL-ACNC: 3.29 MIU/L (ref 0.44–3.98)

## 2024-05-10 PROCEDURE — 36415 COLL VENOUS BLD VENIPUNCTURE: CPT

## 2024-05-11 PROCEDURE — RXMED WILLOW AMBULATORY MEDICATION CHARGE

## 2024-05-13 ENCOUNTER — PHARMACY VISIT (OUTPATIENT)
Dept: PHARMACY | Facility: CLINIC | Age: 64
End: 2024-05-13
Payer: COMMERCIAL

## 2024-05-16 PROBLEM — E78.5 HYPERLIPIDEMIA: Status: RESOLVED | Noted: 2023-09-19 | Resolved: 2024-05-16

## 2024-05-17 ENCOUNTER — OFFICE VISIT (OUTPATIENT)
Dept: PRIMARY CARE | Facility: CLINIC | Age: 64
End: 2024-05-17
Payer: MEDICARE

## 2024-05-17 VITALS
BODY MASS INDEX: 35.68 KG/M2 | OXYGEN SATURATION: 98 % | WEIGHT: 252.2 LBS | DIASTOLIC BLOOD PRESSURE: 78 MMHG | HEART RATE: 78 BPM | SYSTOLIC BLOOD PRESSURE: 130 MMHG

## 2024-05-17 DIAGNOSIS — M54.41 ACUTE RIGHT-SIDED LOW BACK PAIN WITH RIGHT-SIDED SCIATICA: ICD-10-CM

## 2024-05-17 DIAGNOSIS — I10 BENIGN ESSENTIAL HYPERTENSION: ICD-10-CM

## 2024-05-17 DIAGNOSIS — Z79.4 TYPE 2 DIABETES MELLITUS WITH HYPERGLYCEMIA, WITH LONG-TERM CURRENT USE OF INSULIN (MULTI): Primary | ICD-10-CM

## 2024-05-17 DIAGNOSIS — E11.42 TYPE 2 DIABETES MELLITUS WITH DIABETIC POLYNEUROPATHY, WITHOUT LONG-TERM CURRENT USE OF INSULIN (MULTI): ICD-10-CM

## 2024-05-17 DIAGNOSIS — E11.65 TYPE 2 DIABETES MELLITUS WITH HYPERGLYCEMIA, WITH LONG-TERM CURRENT USE OF INSULIN (MULTI): Primary | ICD-10-CM

## 2024-05-17 DIAGNOSIS — E66.01 OBESITY, MORBID (MULTI): ICD-10-CM

## 2024-05-17 PROCEDURE — 3078F DIAST BP <80 MM HG: CPT | Performed by: FAMILY MEDICINE

## 2024-05-17 PROCEDURE — 99214 OFFICE O/P EST MOD 30 MIN: CPT | Performed by: FAMILY MEDICINE

## 2024-05-17 PROCEDURE — 3048F LDL-C <100 MG/DL: CPT | Performed by: FAMILY MEDICINE

## 2024-05-17 PROCEDURE — 3075F SYST BP GE 130 - 139MM HG: CPT | Performed by: FAMILY MEDICINE

## 2024-05-17 PROCEDURE — 1160F RVW MEDS BY RX/DR IN RCRD: CPT | Performed by: FAMILY MEDICINE

## 2024-05-17 PROCEDURE — 4010F ACE/ARB THERAPY RXD/TAKEN: CPT | Performed by: FAMILY MEDICINE

## 2024-05-17 PROCEDURE — 1036F TOBACCO NON-USER: CPT | Performed by: FAMILY MEDICINE

## 2024-05-17 PROCEDURE — 1126F AMNT PAIN NOTED NONE PRSNT: CPT | Performed by: FAMILY MEDICINE

## 2024-05-17 PROCEDURE — 1159F MED LIST DOCD IN RCRD: CPT | Performed by: FAMILY MEDICINE

## 2024-05-17 ASSESSMENT — PATIENT HEALTH QUESTIONNAIRE - PHQ9
2. FEELING DOWN, DEPRESSED OR HOPELESS: NOT AT ALL
1. LITTLE INTEREST OR PLEASURE IN DOING THINGS: NOT AT ALL
SUM OF ALL RESPONSES TO PHQ9 QUESTIONS 1 AND 2: 0

## 2024-05-17 ASSESSMENT — PAIN SCALES - GENERAL: PAINLEVEL: 0-NO PAIN

## 2024-05-17 NOTE — PROGRESS NOTES
Subjective   Patient ID: Malcom Breaux is a 64 y.o. male who presents for Follow-up.    Here for follow up.      Pt is having low back pain.  Worse with walking.  Symptoms for a  few months.  Radiates to right knee.  No injury.  Getting a burning feeling in leg.  No loss of bowel or bladder control.      For DM2:  -A1c: 8  -Follow up:  Wearing freestyle laura 2 - using tresiba and novolog.    -Hypoglycemic Episodes: once a week.  Pt has alarms for hypoglycemia  -Glucose monitoring:  Checking frequently    Hypertension  -Patient is here for follow-up of elevated blood pressure.   -Blood pressure is well controlled.  -Cardiac symptoms: none.   -Patient denies dyspnea and irregular heart beat.   -Cardiologist:           Review of Systems    Objective   /78 (BP Location: Right arm)   Pulse 78   Wt 114 kg (252 lb 3.2 oz)   SpO2 98%   BMI 35.68 kg/m²     Physical Exam  Vitals reviewed.   Constitutional:       General: He is not in acute distress.  Cardiovascular:      Rate and Rhythm: Normal rate and regular rhythm.   Pulmonary:      Effort: Pulmonary effort is normal.      Breath sounds: No wheezing or rhonchi.   Musculoskeletal:      Right lower leg: No edema.      Left lower leg: No edema.      Comments: Lumbar Spine:  -Inspection: normal  -Palpation:   no vertebral spine tenderness;   bilateral paraspinal tenderness  no SI tenderness  -DTR:  Right Patellar: 2/4  Left Patellar: 2/4  Right Achilles: 2/4  Left Achilles: 2/4  -Strength   Hip Flexion: 5/5 bilaterally   Hip Abduction: 5/5 bilaterally   Knee Extension: 5/5 bilaterally   Great Toe Dorsiflex: 5/5 bilaterally   Ankle Plantarflexion: 5/5 bilaterally  -SLR:    Right Leg: mild positive   Left Leg: neg        Lymphadenopathy:      Cervical: No cervical adenopathy.   Neurological:      Mental Status: He is alert.     Colon Cancer Screening Refusal.    -Patient refuses colon cancer screening including FIT testing, Cologuard and colonoscopy. Is aware of the  implications of refusal including missed early detection of colorectal cancer which could lead to increased morbidity and mortality.     Assessment/Plan   Diagnoses and all orders for this visit:  Type 2 diabetes mellitus with hyperglycemia, with long-term current use of insulin (Multi)  Benign essential hypertension  Obesity, morbid (Multi)  Type 2 diabetes mellitus with diabetic polyneuropathy, without long-term current use of insulin (Multi)  Acute right-sided low back pain with right-sided sciatica    Patient Instructions   For DM2 - A1c is 8.  Continue with endocrinology.  Continue tresiba and novolog.      For blood pressure - well controlled on current regimen.     For low back pain - likley arthritis, sciatica.  Recommend tylenol/ibuprofen (do not take with meloxicam), heat/ice.  If not improving please call for xray and therapy.      Follow up in 6 months.

## 2024-05-17 NOTE — PATIENT INSTRUCTIONS
For DM2 - A1c is 8.  Continue with endocrinology.  Continue tresiba and novolog.      For blood pressure - well controlled on current regimen.     For low back pain - likley arthritis, sciatica.  Recommend tylenol/ibuprofen (do not take with meloxicam), heat/ice.  If not improving please call for xray and therapy.      Follow up in 6 months.

## 2024-05-30 PROCEDURE — RXMED WILLOW AMBULATORY MEDICATION CHARGE

## 2024-06-03 ENCOUNTER — PHARMACY VISIT (OUTPATIENT)
Dept: PHARMACY | Facility: CLINIC | Age: 64
End: 2024-06-03
Payer: COMMERCIAL

## 2024-06-11 ENCOUNTER — OFFICE VISIT (OUTPATIENT)
Dept: ENDOCRINOLOGY | Facility: CLINIC | Age: 64
End: 2024-06-11
Payer: MEDICARE

## 2024-06-11 VITALS
SYSTOLIC BLOOD PRESSURE: 150 MMHG | DIASTOLIC BLOOD PRESSURE: 79 MMHG | HEART RATE: 61 BPM | BODY MASS INDEX: 35.48 KG/M2 | WEIGHT: 250.8 LBS

## 2024-06-11 DIAGNOSIS — I10 BENIGN ESSENTIAL HYPERTENSION: ICD-10-CM

## 2024-06-11 DIAGNOSIS — E78.2 MIXED HYPERLIPIDEMIA: ICD-10-CM

## 2024-06-11 DIAGNOSIS — E11.65 TYPE 2 DIABETES MELLITUS WITH HYPERGLYCEMIA, WITH LONG-TERM CURRENT USE OF INSULIN (MULTI): Primary | ICD-10-CM

## 2024-06-11 DIAGNOSIS — E06.3 HYPOTHYROIDISM DUE TO HASHIMOTO'S THYROIDITIS: ICD-10-CM

## 2024-06-11 DIAGNOSIS — Z79.4 TYPE 2 DIABETES MELLITUS WITH HYPERGLYCEMIA, WITH LONG-TERM CURRENT USE OF INSULIN (MULTI): Primary | ICD-10-CM

## 2024-06-11 DIAGNOSIS — E03.8 HYPOTHYROIDISM DUE TO HASHIMOTO'S THYROIDITIS: ICD-10-CM

## 2024-06-11 LAB — POC HEMOGLOBIN A1C: 7.8 % (ref 4.2–6.5)

## 2024-06-11 PROCEDURE — 3048F LDL-C <100 MG/DL: CPT | Performed by: NURSE PRACTITIONER

## 2024-06-11 PROCEDURE — 3077F SYST BP >= 140 MM HG: CPT | Performed by: NURSE PRACTITIONER

## 2024-06-11 PROCEDURE — 99213 OFFICE O/P EST LOW 20 MIN: CPT | Performed by: NURSE PRACTITIONER

## 2024-06-11 PROCEDURE — 83036 HEMOGLOBIN GLYCOSYLATED A1C: CPT | Performed by: NURSE PRACTITIONER

## 2024-06-11 PROCEDURE — 4010F ACE/ARB THERAPY RXD/TAKEN: CPT | Performed by: NURSE PRACTITIONER

## 2024-06-11 PROCEDURE — 1036F TOBACCO NON-USER: CPT | Performed by: NURSE PRACTITIONER

## 2024-06-11 PROCEDURE — 1159F MED LIST DOCD IN RCRD: CPT | Performed by: NURSE PRACTITIONER

## 2024-06-11 PROCEDURE — 1125F AMNT PAIN NOTED PAIN PRSNT: CPT | Performed by: NURSE PRACTITIONER

## 2024-06-11 PROCEDURE — 3078F DIAST BP <80 MM HG: CPT | Performed by: NURSE PRACTITIONER

## 2024-06-11 ASSESSMENT — PAIN SCALES - GENERAL: PAINLEVEL: 2

## 2024-06-11 ASSESSMENT — PATIENT HEALTH QUESTIONNAIRE - PHQ9
2. FEELING DOWN, DEPRESSED OR HOPELESS: SEVERAL DAYS
10. IF YOU CHECKED OFF ANY PROBLEMS, HOW DIFFICULT HAVE THESE PROBLEMS MADE IT FOR YOU TO DO YOUR WORK, TAKE CARE OF THINGS AT HOME, OR GET ALONG WITH OTHER PEOPLE: SOMEWHAT DIFFICULT
1. LITTLE INTEREST OR PLEASURE IN DOING THINGS: SEVERAL DAYS
SUM OF ALL RESPONSES TO PHQ9 QUESTIONS 1 AND 2: 2

## 2024-06-11 ASSESSMENT — ENCOUNTER SYMPTOMS: DEPRESSION: 1

## 2024-06-11 NOTE — PROGRESS NOTES
HPI   Presents for follow up/metabolic management 65 yo with DM Type 2 diagnosed in his 40s. History Hypothyroidism, taking Levothyroxine 200 mcg, RAFAEL, HTN, HLD, CVA. Current A1C 7.8% was 8.0%. Patient testing sugars 4 times a day. One low (62) post prandial. Following carb controlled diet somewhat and know reasonable carb allowances. Patient able to afford their medications. Patient is exercising.         -CGM Lenore 2 with phone christiano. Currently on insulin checking BS at least 4 xs a day adjustments made based on readings/frequent visits to manage.         -INSULIN Tresiba 34 Novolog 5/5/10 ISF 25>125          -Metformin intolerant GLP1 history pancreatitis, SGLT2 no TZD Kush 15mg         -HTN Losartan, HCTZ, Metoprolol, Hydralizine, Amlodipine daily at goal         -STATIN Atorvastatin LDLCALC 55    14 day Lenore report downloaded and attached, time in target 69% no lows avg glucose 162. Waking -150, going into bed mostly in target. Post prandial spikes. Under a lot of stress lately with work. Has been off track with dosing, was out of sensors for a while.        Current Outpatient Medications:     amLODIPine (Norvasc) 10 mg tablet, TAKE 1 TABLET BY MOUTH ONE TIME DAILY, Disp: 90 tablet, Rfl: 1    aspirin (ASPIR-81 ORAL), Take 1 tablet by mouth once daily., Disp: , Rfl:     atorvastatin (Lipitor) 40 mg tablet, TAKE 1 TABLET BY MOUTH ONE TIME DAILY, Disp: 90 tablet, Rfl: 1    blood sugar diagnostic strip, TEST BLOOD SUGAR THREE TIMES A DAY AS DIRECTED, Disp: 300 each, Rfl: 1    blood-glucose meter misc, USE FOUR TIMES A DAY AS DIRECTED., Disp: 1 each, Rfl: 0    FreeStyle Lenore sensor system kit, USE AS DIRECTED EVERY 14 DAYS, Disp: 6 each, Rfl: 1    hydrALAZINE (Apresoline) 100 mg tablet, TAKE 1 TABLET BY MOUTH TWO TIMES A DAY, Disp: 180 tablet, Rfl: 1    hydroCHLOROthiazide (HYDRODiuril) 25 mg tablet, TAKE 1 TABLET BY MOUTH ONE TIME DAILY IN THE MORNING, Disp: 90 tablet, Rfl: 1    insulin aspart (NovoLOG Flexpen  "U-100 Insulin) 100 unit/mL (3 mL) pen, INJECT SUBCUTANEOUSLY BEFORE MEALS UP TO 50 UNITS DAILY, Disp: 45 mL, Rfl: 1    insulin degludec (Tresiba FlexTouch) 200 unit/mL (3 mL) injection, INJECT SUBCUTANEOUSLY UP TO 50 UNITS DAILY AS DIRECTED, Disp: 27 mL, Rfl: 3    insulin syringe-needle U-100 31G X 5/16\" 1 mL syringe, 2 times a day., Disp: , Rfl:     lancets 33 gauge misc, as directed intradermally three times per day for 90 days, Disp: , Rfl:     lancets misc, USE AS DIRECTED THREE TIMES A DAY., Disp: 300 each, Rfl: 1    levothyroxine (Synthroid, Levoxyl) 200 mcg tablet, TAKE 1 TABLET BY MOUTH ONE TIME DAILY IN THE MORNING ON AN EMPTY STOMACH, Disp: 90 tablet, Rfl: 1    losartan (Cozaar) 100 mg tablet, Take 1 tablet (100 mg) by mouth once daily., Disp: 90 tablet, Rfl: 1    meloxicam (Mobic) 15 mg tablet, Take 1 tablet (15 mg) by mouth once daily., Disp: 90 tablet, Rfl: 1    metoprolol tartrate (Lopressor) 100 mg tablet, TAKE 1 TABLET BY MOUTH TWO TIMES A DAY, Disp: 180 tablet, Rfl: 1    omeprazole (PriLOSEC) 40 mg DR capsule, TAKE 1 CAPSULE BY MOUTH ONE TIME DAILY 30 MINUTES BEFORE BREAKFAST, Disp: 90 capsule, Rfl: 1    pen needle, diabetic 32 gauge x 5/32\" needle, 4 times a day. As directed, Disp: , Rfl:     pioglitazone (Actos) 15 mg tablet, TAKE 1 TABLET BY MOUTH ONE TIME DAILY., Disp: 90 tablet, Rfl: 1    sertraline (Zoloft) 100 mg tablet, TAKE 1 TABLET BY MOUTH TWO TIMES A DAY, Disp: 180 tablet, Rfl: 1    sildenafil (Viagra) 100 mg tablet, Take 1 tablet (100 mg) by mouth once daily as needed., Disp: , Rfl:       Allergies as of 06/11/2024 - Reviewed 06/11/2024   Allergen Reaction Noted    Ace inhibitors Cough 09/19/2023    Lisinopril Cough 09/19/2023    Metformin Diarrhea 09/19/2023    Ampicillin Rash and Hives 09/19/2023         Review of Systems   Cardiology: Lightheadedness-denies.  Chest pain-denies.  Leg edema-denies.  Palpitations-denies.  Respiratory: Cough-denies. Shortness of breath-denies.  " Wheezing-denies.  Gastroenterology: Constipation-denies.  Diarrhea-denies.  Heartburn-denies.  Endocrinology: Cold intolerance-denies.  Heat intolerance-denies.  Sweats-denies.  Neurology: Headache-denies.  Tremor-denies.  Neuropathy in extremities-denies.  Psychology: Low energy-denies.  Irritability-denies.  Sleep disturbances-denies.      /79 (BP Location: Left arm, Patient Position: Sitting)   Pulse 61   Wt 114 kg (250 lb 12.8 oz)   BMI 35.48 kg/m²       Labs:  Lab Results   Component Value Date    WBC 8.1 04/10/2024    NRBC 0.0 04/10/2024    RBC 4.84 04/10/2024    HGB 13.7 04/10/2024    HCT 42.0 04/10/2024     04/10/2024     Lab Results   Component Value Date    CALCIUM 10.2 05/10/2024    AST 16 04/10/2024    ALKPHOS 99 04/10/2024    BILITOT 0.5 04/10/2024    PROT 6.8 04/10/2024    ALBUMIN 4.4 04/10/2024    GLOB 2.6 04/04/2023    AGR 1.7 04/04/2023     05/10/2024    K 3.9 05/10/2024     05/10/2024    CO2 28 05/10/2024    ANIONGAP 14 05/10/2024    BUN 31 (H) 05/10/2024    CREATININE 1.16 05/10/2024    UREACREAUR 20.8 04/04/2023    GLUCOSE 144 (H) 05/10/2024    ALT 13 04/10/2024    EGFR 69 05/10/2024     Lab Results   Component Value Date    CHOL 128 04/10/2024    TRIG 112 04/10/2024    HDL 37.7 04/10/2024    LDLCALC 68 04/10/2024     Lab Results   Component Value Date    TSH 3.29 05/10/2024     Lab Results   Component Value Date    HGBA1C 7.8 (A) 06/11/2024         Physical Exam   General Appearance: pleasant, cooperative, no acute distress  HEENT: no chemosis, no proptosis, no lid lag, no lid retraction  Neck: no lymphadenopathy, no thyromegaly, no dominant thyroid nodules  Heart: no murmurs, regular rate and rhythm, S1 and S2  Lungs: no wheezes, no rhonci, no rales  Extremities: no lower extremity swelling      Assessment/Plan   1. Type 2 diabetes mellitus with hyperglycemia, with long-term current use of insulin (CMS/[facility])  -reviewed target bs  -reviewed recommended carbs per  meal/snack  -no changes in medications  -has a dog now, will be walking daily    - POCT glycosylated hemoglobin (Hb A1C) manually resulted    2. Mixed hyperlipidemia  -at target     3. Hypothyroidism due to Hashimoto's thyroiditis  -euthyroid  -no compressive/obstructive neck complaints    4. Benign essential hypertension  -stable     Follow Up: 4 months CNP      -labs/tests/notes reviewed  -reviewed and counseled patient on medication monitoring and side effects  Medical Decision Making  Complexity of problem: Chronic illness of diabetes mellitus uncontrolled, progressing  Data analyzed and reviewed: Reviewed prior notes, blood glucose data, labs including HgbA1c, lipids, serum chemistries.  Ordered tests.   Risk of complications and morbidities: Is definite because of use of insulin and risk of hypoglycemia.  Prescription medications reviewed and modifications made.  Compliance assessed.  Addressed social determinants of health including food insecurity.

## 2024-06-13 ENCOUNTER — PHARMACY VISIT (OUTPATIENT)
Dept: PHARMACY | Facility: CLINIC | Age: 64
End: 2024-06-13
Payer: COMMERCIAL

## 2024-06-13 DIAGNOSIS — Z79.4 TYPE 2 DIABETES MELLITUS WITH HYPERGLYCEMIA, WITH LONG-TERM CURRENT USE OF INSULIN (MULTI): ICD-10-CM

## 2024-06-13 DIAGNOSIS — E11.65 TYPE 2 DIABETES MELLITUS WITH HYPERGLYCEMIA, WITH LONG-TERM CURRENT USE OF INSULIN (MULTI): ICD-10-CM

## 2024-06-13 PROCEDURE — RXMED WILLOW AMBULATORY MEDICATION CHARGE

## 2024-06-13 RX ORDER — PIOGLITAZONEHYDROCHLORIDE 15 MG/1
15 TABLET ORAL DAILY
Qty: 90 TABLET | Refills: 1 | Status: SHIPPED | OUTPATIENT
Start: 2024-06-13 | End: 2025-06-13

## 2024-07-22 DIAGNOSIS — I10 BENIGN ESSENTIAL HYPERTENSION: Primary | ICD-10-CM

## 2024-07-22 DIAGNOSIS — M15.9 GENERALIZED OSTEOARTHRITIS OF MULTIPLE SITES: ICD-10-CM

## 2024-07-22 DIAGNOSIS — I10 BENIGN ESSENTIAL HYPERTENSION: ICD-10-CM

## 2024-07-22 PROCEDURE — RXMED WILLOW AMBULATORY MEDICATION CHARGE

## 2024-07-22 RX ORDER — AMLODIPINE BESYLATE 10 MG/1
10 TABLET ORAL DAILY
Qty: 90 TABLET | Refills: 1 | Status: SHIPPED | OUTPATIENT
Start: 2024-07-22 | End: 2025-07-22

## 2024-07-22 RX ORDER — MELOXICAM 15 MG/1
15 TABLET ORAL DAILY
Qty: 90 TABLET | Refills: 1 | Status: SHIPPED | OUTPATIENT
Start: 2024-07-22

## 2024-07-22 RX ORDER — LOSARTAN POTASSIUM 100 MG/1
100 TABLET ORAL DAILY
Qty: 90 TABLET | Refills: 1 | Status: SHIPPED | OUTPATIENT
Start: 2024-07-22

## 2024-07-23 ENCOUNTER — PHARMACY VISIT (OUTPATIENT)
Dept: PHARMACY | Facility: CLINIC | Age: 64
End: 2024-07-23
Payer: COMMERCIAL

## 2024-08-09 DIAGNOSIS — E78.2 COMBINED HYPERLIPIDEMIA: ICD-10-CM

## 2024-08-09 PROCEDURE — RXMED WILLOW AMBULATORY MEDICATION CHARGE

## 2024-08-09 RX ORDER — ATORVASTATIN CALCIUM 40 MG/1
40 TABLET, FILM COATED ORAL DAILY
Qty: 90 TABLET | Refills: 1 | Status: SHIPPED | OUTPATIENT
Start: 2024-08-09 | End: 2025-08-09

## 2024-08-10 ENCOUNTER — PHARMACY VISIT (OUTPATIENT)
Dept: PHARMACY | Facility: CLINIC | Age: 64
End: 2024-08-10
Payer: COMMERCIAL

## 2024-08-16 DIAGNOSIS — E11.65 TYPE 2 DIABETES MELLITUS WITH HYPERGLYCEMIA, WITH LONG-TERM CURRENT USE OF INSULIN (MULTI): Primary | ICD-10-CM

## 2024-08-16 DIAGNOSIS — Z79.4 TYPE 2 DIABETES MELLITUS WITH HYPERGLYCEMIA, WITH LONG-TERM CURRENT USE OF INSULIN (MULTI): Primary | ICD-10-CM

## 2024-08-16 RX ORDER — INSULIN DEGLUDEC 200 U/ML
INJECTION, SOLUTION SUBCUTANEOUS
Qty: 27 ML | Refills: 3 | Status: SHIPPED | OUTPATIENT
Start: 2024-08-16 | End: 2025-08-16

## 2024-08-26 DIAGNOSIS — Z79.4 TYPE 2 DIABETES MELLITUS WITH HYPERGLYCEMIA, WITH LONG-TERM CURRENT USE OF INSULIN (MULTI): ICD-10-CM

## 2024-08-26 DIAGNOSIS — E11.65 TYPE 2 DIABETES MELLITUS WITH HYPERGLYCEMIA, WITH LONG-TERM CURRENT USE OF INSULIN (MULTI): ICD-10-CM

## 2024-08-26 RX ORDER — INSULIN ASPART 100 [IU]/ML
INJECTION, SOLUTION INTRAVENOUS; SUBCUTANEOUS
Qty: 45 ML | Refills: 1 | Status: SHIPPED | OUTPATIENT
Start: 2024-08-26 | End: 2025-08-26

## 2024-09-11 DIAGNOSIS — E03.9 HYPOTHYROIDISM, UNSPECIFIED TYPE: ICD-10-CM

## 2024-09-11 DIAGNOSIS — F41.8 DEPRESSION WITH ANXIETY: ICD-10-CM

## 2024-09-11 DIAGNOSIS — K21.9 GERD WITHOUT ESOPHAGITIS: ICD-10-CM

## 2024-09-11 DIAGNOSIS — I10 BENIGN ESSENTIAL HYPERTENSION: ICD-10-CM

## 2024-09-11 PROCEDURE — RXMED WILLOW AMBULATORY MEDICATION CHARGE

## 2024-09-11 RX ORDER — HYDROCHLOROTHIAZIDE 25 MG/1
25 TABLET ORAL EVERY MORNING
Qty: 90 TABLET | Refills: 1 | Status: SHIPPED | OUTPATIENT
Start: 2024-09-11 | End: 2025-09-11

## 2024-09-11 RX ORDER — OMEPRAZOLE 40 MG/1
CAPSULE, DELAYED RELEASE ORAL
Qty: 90 CAPSULE | Refills: 1 | Status: SHIPPED | OUTPATIENT
Start: 2024-09-11 | End: 2025-09-11

## 2024-09-11 RX ORDER — LEVOTHYROXINE SODIUM 200 UG/1
TABLET ORAL
Qty: 90 TABLET | Refills: 1 | Status: SHIPPED | OUTPATIENT
Start: 2024-09-11 | End: 2025-09-11

## 2024-09-11 RX ORDER — SERTRALINE HYDROCHLORIDE 100 MG/1
100 TABLET, FILM COATED ORAL 2 TIMES DAILY
Qty: 180 TABLET | Refills: 1 | Status: SHIPPED | OUTPATIENT
Start: 2024-09-11 | End: 2025-09-11

## 2024-09-12 ENCOUNTER — PHARMACY VISIT (OUTPATIENT)
Dept: PHARMACY | Facility: CLINIC | Age: 64
End: 2024-09-12
Payer: COMMERCIAL

## 2024-09-23 ENCOUNTER — TELEPHONE (OUTPATIENT)
Dept: ENDOCRINOLOGY | Facility: CLINIC | Age: 64
End: 2024-09-23
Payer: MEDICARE

## 2024-09-23 NOTE — TELEPHONE ENCOUNTER
Malcom Breaux   1960   [id number]   [phone]       Called and spoke to patient in regards to canceling appt on 10/15/24 with CNP to MD due to provider is leaving practice.

## 2024-10-02 DIAGNOSIS — E11.65 TYPE 2 DIABETES MELLITUS WITH HYPERGLYCEMIA, WITH LONG-TERM CURRENT USE OF INSULIN: Primary | ICD-10-CM

## 2024-10-02 DIAGNOSIS — Z79.4 TYPE 2 DIABETES MELLITUS WITH HYPERGLYCEMIA, WITH LONG-TERM CURRENT USE OF INSULIN: Primary | ICD-10-CM

## 2024-10-02 PROCEDURE — RXMED WILLOW AMBULATORY MEDICATION CHARGE

## 2024-10-02 RX ORDER — PEN NEEDLE, DIABETIC 30 GX3/16"
NEEDLE, DISPOSABLE MISCELLANEOUS
Qty: 400 EACH | Refills: 2 | Status: SHIPPED | OUTPATIENT
Start: 2024-10-02

## 2024-10-05 ENCOUNTER — PHARMACY VISIT (OUTPATIENT)
Dept: PHARMACY | Facility: CLINIC | Age: 64
End: 2024-10-05
Payer: COMMERCIAL

## 2024-10-07 ENCOUNTER — PHARMACY VISIT (OUTPATIENT)
Dept: PHARMACY | Facility: CLINIC | Age: 64
End: 2024-10-07

## 2024-10-15 ENCOUNTER — APPOINTMENT (OUTPATIENT)
Dept: ENDOCRINOLOGY | Facility: CLINIC | Age: 64
End: 2024-10-15
Payer: MEDICARE

## 2024-10-31 ENCOUNTER — APPOINTMENT (OUTPATIENT)
Dept: ENDOCRINOLOGY | Facility: CLINIC | Age: 64
End: 2024-10-31
Payer: MEDICARE

## 2024-10-31 ENCOUNTER — TELEPHONE (OUTPATIENT)
Dept: ENDOCRINOLOGY | Facility: CLINIC | Age: 64
End: 2024-10-31

## 2024-11-07 ENCOUNTER — APPOINTMENT (OUTPATIENT)
Dept: ENDOCRINOLOGY | Facility: CLINIC | Age: 64
End: 2024-11-07
Payer: MEDICARE

## 2024-11-07 VITALS
BODY MASS INDEX: 35.39 KG/M2 | SYSTOLIC BLOOD PRESSURE: 134 MMHG | DIASTOLIC BLOOD PRESSURE: 76 MMHG | WEIGHT: 250.2 LBS | HEART RATE: 58 BPM

## 2024-11-07 DIAGNOSIS — Z79.4 TYPE 2 DIABETES MELLITUS WITH HYPERGLYCEMIA, WITH LONG-TERM CURRENT USE OF INSULIN: Primary | ICD-10-CM

## 2024-11-07 DIAGNOSIS — E11.65 TYPE 2 DIABETES MELLITUS WITH HYPERGLYCEMIA, WITH LONG-TERM CURRENT USE OF INSULIN: Primary | ICD-10-CM

## 2024-11-07 DIAGNOSIS — E78.2 MIXED HYPERLIPIDEMIA: ICD-10-CM

## 2024-11-07 LAB — POC HEMOGLOBIN A1C: 7.5 % (ref 4.2–6.5)

## 2024-11-07 PROCEDURE — 83036 HEMOGLOBIN GLYCOSYLATED A1C: CPT | Performed by: INTERNAL MEDICINE

## 2024-11-07 PROCEDURE — 95251 CONT GLUC MNTR ANALYSIS I&R: CPT | Performed by: INTERNAL MEDICINE

## 2024-11-07 PROCEDURE — 99214 OFFICE O/P EST MOD 30 MIN: CPT | Performed by: INTERNAL MEDICINE

## 2024-11-07 NOTE — PROGRESS NOTES
"HPI     Presents for follow up/metabolic management 63 yo with DM Type 2 diagnosed in his 40s. History Hypothyroidism, taking Levothyroxine 200 mcg, RAFAEL, HTN, HLD, CVA.  A1c- last visit 7.8%, today 7.5%.    Patient testing sugars >4 times a day using laura 2 w/ reader.  Following carb controlled diet somewhat and know reasonable carb allowances. Patient able to afford their medications. Patient is exercising.    Taking Tresiba 30 units, Novolog 5-10-10-0, ISF 25>125 (occ dosing at hs if high), pioglitazone 15mg daily.   -intol metformin,  no glp1 given hx pancreatitis  -SGLT2 no     HTN Losartan, HCTZ, Metoprolol, Hydralizine, Amlodipine daily at goal    Taking Atorvastatin 40mg daily for lipids & tolerating.      -30 day laura 2 data reviewed and attached:  73% in range, 1% lows, patterns: upper 100's overnight, waking mid to low 100's, mid 100's mostly through the day, upper 100's after dinner into hs.  Occ postprandial excursions into low 200 range         Current Outpatient Medications:     amLODIPine (Norvasc) 10 mg tablet, TAKE 1 TABLET BY MOUTH ONE TIME DAILY, Disp: 90 tablet, Rfl: 1    aspirin (ASPIR-81 ORAL), Take 1 tablet by mouth once daily., Disp: , Rfl:     atorvastatin (Lipitor) 40 mg tablet, TAKE 1 TABLET BY MOUTH ONE TIME DAILY, Disp: 90 tablet, Rfl: 1    hydrALAZINE (Apresoline) 100 mg tablet, TAKE 1 TABLET BY MOUTH TWO TIMES A DAY, Disp: 180 tablet, Rfl: 1    hydroCHLOROthiazide (HYDRODiuril) 25 mg tablet, TAKE 1 TABLET BY MOUTH ONE TIME DAILY IN THE MORNING, Disp: 90 tablet, Rfl: 1    insulin aspart (NovoLOG Flexpen U-100 Insulin) 100 unit/mL (3 mL) pen, INJECT SUBCUTANEOUSLY BEFORE MEALS UP TO 50 UNITS DAILY, Disp: 45 mL, Rfl: 1    insulin degludec (Tresiba FlexTouch U-200) 200 unit/mL (3 mL) injection, INJECT SUBCUTANEOUSLY UP TO 50 UNITS DAILY AS DIRECTED, Disp: 27 mL, Rfl: 3    insulin syringe-needle U-100 31G X 5/16\" 1 mL syringe, 2 times a day., Disp: , Rfl:     lancets 33 gauge misc, as " "directed intradermally three times per day for 90 days, Disp: , Rfl:     levothyroxine (Synthroid, Levoxyl) 200 mcg tablet, TAKE 1 TABLET BY MOUTH ONE TIME DAILY IN THE MORNING ON AN EMPTY STOMACH, Disp: 90 tablet, Rfl: 1    losartan (Cozaar) 100 mg tablet, Take 1 tablet (100 mg) by mouth once daily., Disp: 90 tablet, Rfl: 1    meloxicam (Mobic) 15 mg tablet, Take 1 tablet (15 mg) by mouth once daily., Disp: 90 tablet, Rfl: 1    metoprolol tartrate (Lopressor) 100 mg tablet, TAKE 1 TABLET BY MOUTH TWO TIMES A DAY, Disp: 180 tablet, Rfl: 1    omeprazole (PriLOSEC) 40 mg DR capsule, TAKE 1 CAPSULE BY MOUTH ONE TIME DAILY 30 MINUTES BEFORE BREAKFAST, Disp: 90 capsule, Rfl: 1    pen needle, diabetic 32 gauge x 5/32\" needle, Use 4 times day, Disp: 400 each, Rfl: 2    pioglitazone (Actos) 15 mg tablet, TAKE 1 TABLET BY MOUTH ONE TIME DAILY., Disp: 90 tablet, Rfl: 1    sertraline (Zoloft) 100 mg tablet, TAKE 1 TABLET BY MOUTH TWO TIMES A DAY, Disp: 180 tablet, Rfl: 1    sildenafil (Viagra) 100 mg tablet, Take 1 tablet (100 mg) by mouth once daily as needed., Disp: , Rfl:     Allergies as of 11/07/2024 - Reviewed 11/07/2024   Allergen Reaction Noted    Ace inhibitors Cough 09/19/2023    Lisinopril Cough 09/19/2023    Metformin Diarrhea 09/19/2023    Ampicillin Rash and Hives 09/19/2023       /76   Pulse 58   Wt 113 kg (250 lb 3.2 oz)   BMI 35.39 kg/m²     Labs:   Lab Results   Component Value Date    WBC 8.1 04/10/2024    NRBC 0.0 04/10/2024    RBC 4.84 04/10/2024    HGB 13.7 04/10/2024    HCT 42.0 04/10/2024     04/10/2024     Lab Results   Component Value Date    CALCIUM 10.2 05/10/2024    AST 16 04/10/2024    ALKPHOS 99 04/10/2024    BILITOT 0.5 04/10/2024    PROT 6.8 04/10/2024    ALBUMIN 4.4 04/10/2024    GLOB 2.6 04/04/2023    AGR 1.7 04/04/2023     05/10/2024    K 3.9 05/10/2024     05/10/2024    CO2 28 05/10/2024    ANIONGAP 14 05/10/2024    BUN 31 (H) 05/10/2024    CREATININE 1.16 " "05/10/2024    UREACREAUR 20.8 04/04/2023    GLUCOSE 144 (H) 05/10/2024    ALT 13 04/10/2024    EGFR 69 05/10/2024     Lab Results   Component Value Date    CHOL 128 04/10/2024    TRIG 112 04/10/2024    HDL 37.7 04/10/2024    LDLCALC 68 04/10/2024     No results found for: \"MICROALBCREA\"  Lab Results   Component Value Date    TSH 3.29 05/10/2024     No results found for: \"OSOCQAYW54\"  Lab Results   Component Value Date    HGBA1C 7.5 (A) 11/07/2024         Assessment/Plan   1. Type 2 diabetes mellitus with hyperglycemia, with long-term current use of insulin (Primary)    -A1c ordered & reviewed  -labs reviewed  -laura data reviewed, scanned into epic     -overall doing well, some slight post prandial excursions  -reinforced using laura trends/patterns to help aid with adjusting bolus insulin      -no bolus insulin at hs   -titrate basal insulin 2 unit intervals q3d as needed for am sugars <100    2. Mixed hyperlipidemia  -on statin, tolerating      Follow up: 4mon bb    -labs/tests/notes reviewed  -reviewed and counseled patient on medication monitoring and side effects    Treatment and plan discussed with Dr. Beyer.  RAY Burnette, PharmD, BC-ADM, [facility].     Medical Decision Making  Complexity of problem: Chronic illness of diabetes mellitus uncontrolled, progressing  Data analyzed and reviewed: Reviewed prior notes, blood glucose data, labs including HgbA1c, lipids, serum chemistries.  Ordered tests.   Risk of complications and morbidities: Is definite because of use of insulin and risk of hypoglycemia.  Prescription medications reviewed and modifications made.  Compliance assessed.  Addressed social determinants of health including food insecurity.    "

## 2024-11-07 NOTE — PROGRESS NOTES
Attestation signed by Lazaro Beyer MD on 11/7/24 at 4:34 PM.    I, Dr Lazaro Beyer, have reviewed this progress note, medication list, vital signs, any pertinent lab values, and any CGM data if present with the [facility] and  face to face during this visit today. This note reflects the treatment plan that was made under my direction after reviewing the above mentioned elements while face to face with the patient and CDE.  I personally answered and addressed any questions and concerns the patient had during the visit today.  The CDE entered the data in this note under my direction and I personally reviewed it, signed any lab or medication orders that I instructed to be completed. I am the billing provider for this visit and the level of service was determined by my involvement in the Medical Decision Making Component of this visit while face to face with the patient.

## 2024-11-07 NOTE — PATIENT INSTRUCTIONS
Follow laura readings closely,  learning which meals/snacks cause higher blood sugars or may require more/less mealtime insulin    Can decrease Tresiba 2 unit intervals every 3 days if needed for morning sugars dropping below 100

## 2024-11-20 ENCOUNTER — OFFICE VISIT (OUTPATIENT)
Dept: PRIMARY CARE | Facility: CLINIC | Age: 64
End: 2024-11-20
Payer: MEDICARE

## 2024-11-20 VITALS
RESPIRATION RATE: 18 BRPM | OXYGEN SATURATION: 95 % | TEMPERATURE: 98.4 F | DIASTOLIC BLOOD PRESSURE: 78 MMHG | HEIGHT: 71 IN | BODY MASS INDEX: 35.17 KG/M2 | HEART RATE: 67 BPM | SYSTOLIC BLOOD PRESSURE: 138 MMHG | WEIGHT: 251.2 LBS

## 2024-11-20 DIAGNOSIS — Z12.5 SCREENING FOR PROSTATE CANCER: ICD-10-CM

## 2024-11-20 DIAGNOSIS — E06.3 HYPOTHYROIDISM DUE TO HASHIMOTO THYROIDITIS: ICD-10-CM

## 2024-11-20 DIAGNOSIS — I10 BENIGN ESSENTIAL HYPERTENSION: ICD-10-CM

## 2024-11-20 DIAGNOSIS — E11.42 TYPE 2 DIABETES MELLITUS WITH DIABETIC POLYNEUROPATHY, WITHOUT LONG-TERM CURRENT USE OF INSULIN: ICD-10-CM

## 2024-11-20 DIAGNOSIS — Z23 ENCOUNTER FOR IMMUNIZATION: ICD-10-CM

## 2024-11-20 DIAGNOSIS — Z00.00 ROUTINE GENERAL MEDICAL EXAMINATION AT HEALTH CARE FACILITY: Primary | ICD-10-CM

## 2024-11-20 DIAGNOSIS — G47.33 SEVERE OBSTRUCTIVE SLEEP APNEA-HYPOPNEA SYNDROME: ICD-10-CM

## 2024-11-20 PROCEDURE — 99214 OFFICE O/P EST MOD 30 MIN: CPT | Performed by: FAMILY MEDICINE

## 2024-11-20 PROCEDURE — G0439 PPPS, SUBSEQ VISIT: HCPCS | Performed by: FAMILY MEDICINE

## 2024-11-20 PROCEDURE — 1159F MED LIST DOCD IN RCRD: CPT | Performed by: FAMILY MEDICINE

## 2024-11-20 PROCEDURE — 3008F BODY MASS INDEX DOCD: CPT | Performed by: FAMILY MEDICINE

## 2024-11-20 PROCEDURE — 1160F RVW MEDS BY RX/DR IN RCRD: CPT | Performed by: FAMILY MEDICINE

## 2024-11-20 PROCEDURE — 1036F TOBACCO NON-USER: CPT | Performed by: FAMILY MEDICINE

## 2024-11-20 PROCEDURE — 1170F FXNL STATUS ASSESSED: CPT | Performed by: FAMILY MEDICINE

## 2024-11-20 PROCEDURE — 3075F SYST BP GE 130 - 139MM HG: CPT | Performed by: FAMILY MEDICINE

## 2024-11-20 PROCEDURE — 99215 OFFICE O/P EST HI 40 MIN: CPT | Performed by: FAMILY MEDICINE

## 2024-11-20 PROCEDURE — 1124F ACP DISCUSS-NO DSCNMKR DOCD: CPT | Performed by: FAMILY MEDICINE

## 2024-11-20 PROCEDURE — 90662 IIV NO PRSV INCREASED AG IM: CPT | Performed by: FAMILY MEDICINE

## 2024-11-20 PROCEDURE — 3078F DIAST BP <80 MM HG: CPT | Performed by: FAMILY MEDICINE

## 2024-11-20 PROCEDURE — 4010F ACE/ARB THERAPY RXD/TAKEN: CPT | Performed by: FAMILY MEDICINE

## 2024-11-20 PROCEDURE — 3048F LDL-C <100 MG/DL: CPT | Performed by: FAMILY MEDICINE

## 2024-11-20 ASSESSMENT — ACTIVITIES OF DAILY LIVING (ADL)
BATHING: INDEPENDENT
MANAGING_FINANCES: INDEPENDENT
DRESSING: INDEPENDENT
DOING_HOUSEWORK: INDEPENDENT
GROCERY_SHOPPING: INDEPENDENT
TAKING_MEDICATION: INDEPENDENT

## 2024-11-20 ASSESSMENT — PATIENT HEALTH QUESTIONNAIRE - PHQ9
SUM OF ALL RESPONSES TO PHQ9 QUESTIONS 1 AND 2: 0
1. LITTLE INTEREST OR PLEASURE IN DOING THINGS: NOT AT ALL
2. FEELING DOWN, DEPRESSED OR HOPELESS: NOT AT ALL

## 2024-11-20 ASSESSMENT — LIFESTYLE VARIABLES
HOW MANY STANDARD DRINKS CONTAINING ALCOHOL DO YOU HAVE ON A TYPICAL DAY: PATIENT DOES NOT DRINK
AUDIT-C TOTAL SCORE: 0
HOW OFTEN DO YOU HAVE A DRINK CONTAINING ALCOHOL: NEVER
HOW OFTEN DO YOU HAVE SIX OR MORE DRINKS ON ONE OCCASION: NEVER
SKIP TO QUESTIONS 9-10: 1

## 2024-11-20 ASSESSMENT — ENCOUNTER SYMPTOMS
DEPRESSION: 0
LOSS OF SENSATION IN FEET: 0
OCCASIONAL FEELINGS OF UNSTEADINESS: 0

## 2024-11-20 NOTE — PATIENT INSTRUCTIONS
Here for medicare physical.     Discussed with patient the need for Advanced Directive.  Recommend contacting your [facility] Department of Aging and/or a local  for documentation.      For A1c - A1c is 7.5 - well controlled.  Continue with Dr. Beyer.  Continue insulin therapy    For blood pressure - well cotnrolled.      Pt has sleep apnea - likely causing elevated blood pressure.  Referral to ENT for opinion on inspire.      Follow up in 6 months - blood work 1 week prior.

## 2024-11-20 NOTE — PROGRESS NOTES
"Subjective   Reason for Visit: Malcom Breaux is an 64 y.o. male here for a Medicare Wellness visit.     Past Medical, Surgical, and Family History reviewed and updated in chart.    Reviewed all medications by prescribing practitioner or clinical pharmacist (such as prescriptions, OTCs, herbal therapies and supplements) and documented in the medical record.    Here for mediation physical.      For DM2:  -A1c: 7.2  -Follow up:  Wearing freestyle laura 2 - using tresiba and novolog.    Weaning down tresiba.  Tolerating medications.    -Hypoglycemic Episodes: None since last visit.  Pt has alarms for hypoglycemia  -Glucose monitoring:  Checking frequently    Hypertension  -Patient is here for follow-up of elevated blood pressure.  Tolerating medications.  No dizziness.   -Blood pressure is well controlled.  -Cardiac symptoms: none.   -Patient denies dyspnea and irregular heart beat.   -Cardiologist:    Dyslipidemia  -F/U: On atorvastatin - tolerating well.    -Compliance with treatment thus far has been good.     Obstructed Sleep Apnea  -F/U: Unable to tolerate CPAP.  Would take it off middle of night.    -Compliance: Tried to wear - could not tolerate  -Response:  Poor  -Specialist: none            Patient Care Team:  Gallo Haney DO as PCP - General (Family Medicine)  Gallo Haney DO as PCP - [employer] PCP     Review of Systems    Objective   Vitals:  /78   Pulse 67   Temp 36.9 °C (98.4 °F) (Temporal)   Resp 18   Ht 1.803 m (5' 11\")   Wt 114 kg (251 lb 3.2 oz)   SpO2 95%   BMI 35.04 kg/m²       Physical Exam  Vitals reviewed.   Constitutional:       General: He is not in acute distress.  Cardiovascular:      Rate and Rhythm: Normal rate and regular rhythm.   Pulmonary:      Effort: Pulmonary effort is normal.      Breath sounds: No wheezing or rhonchi.   Musculoskeletal:      Right lower leg: Edema present.      Left lower leg: Edema present.   Lymphadenopathy:      Cervical: No " cervical adenopathy.   Neurological:      Mental Status: He is alert.         Assessment & Plan  Encounter for immunization    Orders:    Flu vaccine, trivalent, preservative free, HIGH-DOSE, age 65y+ (Fluzone)    Routine general medical examination at health care facility    Orders:    1 Year Follow Up In Primary Care - Wellness Exam; Future    Benign essential hypertension         Hypothyroidism due to Hashimoto thyroiditis         Type 2 diabetes mellitus with diabetic polyneuropathy, without long-term current use of insulin         Severe obstructive sleep apnea-hypopnea syndrome            Patient Instructions   Here for medicare physical.     Discussed with patient the need for Advanced Directive.  Recommend contacting your [facility] Department of [employer] and/or a local  for documentation.      For A1c - A1c is 7.5 - well controlled.  Continue with Dr. Beyer.  Continue insulin therapy    For blood pressure - well cotnrolled.      Pt has sleep apnea - likely causing elevated blood pressure.  Referral to ENT for opinion on inspire.      Follow up in 6 months - blood work 1 week prior.

## 2024-11-26 PROCEDURE — RXMED WILLOW AMBULATORY MEDICATION CHARGE

## 2024-11-29 ENCOUNTER — PHARMACY VISIT (OUTPATIENT)
Dept: PHARMACY | Facility: CLINIC | Age: 64
End: 2024-11-29
Payer: COMMERCIAL

## 2024-12-10 DIAGNOSIS — E11.65 TYPE 2 DIABETES MELLITUS WITH HYPERGLYCEMIA, WITH LONG-TERM CURRENT USE OF INSULIN: ICD-10-CM

## 2024-12-10 DIAGNOSIS — Z79.4 TYPE 2 DIABETES MELLITUS WITH HYPERGLYCEMIA, WITH LONG-TERM CURRENT USE OF INSULIN: ICD-10-CM

## 2024-12-10 PROCEDURE — RXMED WILLOW AMBULATORY MEDICATION CHARGE

## 2024-12-10 RX ORDER — PIOGLITAZONEHYDROCHLORIDE 15 MG/1
15 TABLET ORAL DAILY
Qty: 90 TABLET | Refills: 1 | Status: SHIPPED | OUTPATIENT
Start: 2024-12-10 | End: 2025-12-10

## 2024-12-12 ENCOUNTER — PHARMACY VISIT (OUTPATIENT)
Dept: PHARMACY | Facility: CLINIC | Age: 64
End: 2024-12-12
Payer: COMMERCIAL

## 2025-01-16 ENCOUNTER — APPOINTMENT (OUTPATIENT)
Dept: SLEEP MEDICINE | Facility: CLINIC | Age: 65
End: 2025-01-16
Payer: MEDICARE

## 2025-02-12 PROCEDURE — RXMED WILLOW AMBULATORY MEDICATION CHARGE

## 2025-02-14 ENCOUNTER — PHARMACY VISIT (OUTPATIENT)
Dept: PHARMACY | Facility: CLINIC | Age: 65
End: 2025-02-14
Payer: COMMERCIAL

## 2025-03-05 DIAGNOSIS — K21.9 GERD WITHOUT ESOPHAGITIS: ICD-10-CM

## 2025-03-05 DIAGNOSIS — E03.9 HYPOTHYROIDISM, UNSPECIFIED TYPE: ICD-10-CM

## 2025-03-05 DIAGNOSIS — I10 BENIGN ESSENTIAL HYPERTENSION: ICD-10-CM

## 2025-03-05 DIAGNOSIS — F41.8 DEPRESSION WITH ANXIETY: ICD-10-CM

## 2025-03-05 PROCEDURE — RXMED WILLOW AMBULATORY MEDICATION CHARGE

## 2025-03-05 RX ORDER — LEVOTHYROXINE SODIUM 200 UG/1
TABLET ORAL
Qty: 90 TABLET | Refills: 1 | Status: SHIPPED | OUTPATIENT
Start: 2025-03-05 | End: 2026-03-05

## 2025-03-05 RX ORDER — HYDROCHLOROTHIAZIDE 25 MG/1
25 TABLET ORAL EVERY MORNING
Qty: 90 TABLET | Refills: 1 | Status: SHIPPED | OUTPATIENT
Start: 2025-03-05 | End: 2026-03-05

## 2025-03-05 RX ORDER — OMEPRAZOLE 40 MG/1
CAPSULE, DELAYED RELEASE ORAL
Qty: 90 CAPSULE | Refills: 1 | Status: SHIPPED | OUTPATIENT
Start: 2025-03-05 | End: 2026-03-05

## 2025-03-05 RX ORDER — SERTRALINE HYDROCHLORIDE 100 MG/1
100 TABLET, FILM COATED ORAL 2 TIMES DAILY
Qty: 180 TABLET | Refills: 1 | Status: SHIPPED | OUTPATIENT
Start: 2025-03-05 | End: 2026-03-05

## 2025-03-06 ENCOUNTER — PHARMACY VISIT (OUTPATIENT)
Dept: PHARMACY | Facility: CLINIC | Age: 65
End: 2025-03-06
Payer: COMMERCIAL

## 2025-03-28 ENCOUNTER — APPOINTMENT (OUTPATIENT)
Dept: ENDOCRINOLOGY | Facility: CLINIC | Age: 65
End: 2025-03-28
Payer: MEDICARE

## 2025-03-28 ENCOUNTER — TELEPHONE (OUTPATIENT)
Dept: ENDOCRINOLOGY | Facility: CLINIC | Age: 65
End: 2025-03-28

## 2025-03-28 PROCEDURE — RXMED WILLOW AMBULATORY MEDICATION CHARGE

## 2025-03-28 NOTE — TELEPHONE ENCOUNTER
"Called and spoke to Malcom Breaux about missing his appt today 03/28/2025. He said, \"He cancelled via text message received\". He did not want to reschedule at this time.  "

## 2025-03-29 ENCOUNTER — PHARMACY VISIT (OUTPATIENT)
Dept: PHARMACY | Facility: CLINIC | Age: 65
End: 2025-03-29
Payer: COMMERCIAL

## 2025-04-18 DIAGNOSIS — M15.9 GENERALIZED OSTEOARTHRITIS OF MULTIPLE SITES: ICD-10-CM

## 2025-04-18 DIAGNOSIS — I10 BENIGN ESSENTIAL HYPERTENSION: ICD-10-CM

## 2025-04-18 PROCEDURE — RXMED WILLOW AMBULATORY MEDICATION CHARGE

## 2025-04-18 RX ORDER — MELOXICAM 15 MG/1
15 TABLET ORAL DAILY
Qty: 90 TABLET | Refills: 1 | Status: SHIPPED | OUTPATIENT
Start: 2025-04-18

## 2025-04-18 RX ORDER — LOSARTAN POTASSIUM 100 MG/1
100 TABLET ORAL DAILY
Qty: 90 TABLET | Refills: 1 | Status: SHIPPED | OUTPATIENT
Start: 2025-04-18

## 2025-04-21 ENCOUNTER — PHARMACY VISIT (OUTPATIENT)
Dept: PHARMACY | Facility: CLINIC | Age: 65
End: 2025-04-21
Payer: COMMERCIAL

## 2025-05-05 PROCEDURE — RXMED WILLOW AMBULATORY MEDICATION CHARGE

## 2025-05-06 ENCOUNTER — PHARMACY VISIT (OUTPATIENT)
Dept: PHARMACY | Facility: CLINIC | Age: 65
End: 2025-05-06
Payer: COMMERCIAL

## 2025-05-08 DIAGNOSIS — I10 BENIGN ESSENTIAL HYPERTENSION: ICD-10-CM

## 2025-05-08 RX ORDER — AMLODIPINE BESYLATE 10 MG/1
10 TABLET ORAL DAILY
Qty: 90 TABLET | Refills: 1 | Status: SHIPPED | OUTPATIENT
Start: 2025-05-08 | End: 2026-05-08

## 2025-05-08 RX ORDER — HYDRALAZINE HYDROCHLORIDE 100 MG/1
100 TABLET, FILM COATED ORAL 2 TIMES DAILY
Qty: 180 TABLET | Refills: 1 | Status: SHIPPED | OUTPATIENT
Start: 2025-05-08 | End: 2026-05-08

## 2025-05-15 LAB
ALBUMIN SERPL-MCNC: 4.5 G/DL (ref 3.6–5.1)
ALP SERPL-CCNC: 93 U/L (ref 35–144)
ALT SERPL-CCNC: 12 U/L (ref 9–46)
ANION GAP SERPL CALCULATED.4IONS-SCNC: 11 MMOL/L (CALC) (ref 7–17)
AST SERPL-CCNC: 13 U/L (ref 10–35)
BASOPHILS # BLD AUTO: 41 CELLS/UL (ref 0–200)
BASOPHILS NFR BLD AUTO: 0.6 %
BILIRUB SERPL-MCNC: 0.5 MG/DL (ref 0.2–1.2)
BUN SERPL-MCNC: 29 MG/DL (ref 7–25)
CALCIUM SERPL-MCNC: 10.6 MG/DL (ref 8.6–10.3)
CHLORIDE SERPL-SCNC: 103 MMOL/L (ref 98–110)
CHOLEST SERPL-MCNC: 131 MG/DL
CHOLEST/HDLC SERPL: 2.9 (CALC)
CO2 SERPL-SCNC: 26 MMOL/L (ref 20–32)
CREAT SERPL-MCNC: 1.34 MG/DL (ref 0.7–1.35)
EGFRCR SERPLBLD CKD-EPI 2021: 58 ML/MIN/1.73M2
EOSINOPHIL # BLD AUTO: 186 CELLS/UL (ref 15–500)
EOSINOPHIL NFR BLD AUTO: 2.7 %
ERYTHROCYTE [DISTWIDTH] IN BLOOD BY AUTOMATED COUNT: 13.5 % (ref 11–15)
GLUCOSE SERPL-MCNC: 192 MG/DL (ref 65–139)
HCT VFR BLD AUTO: 43.7 % (ref 38.5–50)
HDLC SERPL-MCNC: 45 MG/DL
HGB BLD-MCNC: 13.8 G/DL (ref 13.2–17.1)
LDLC SERPL CALC-MCNC: 65 MG/DL (CALC)
LYMPHOCYTES # BLD AUTO: 1463 CELLS/UL (ref 850–3900)
LYMPHOCYTES NFR BLD AUTO: 21.2 %
MCH RBC QN AUTO: 28.3 PG (ref 27–33)
MCHC RBC AUTO-ENTMCNC: 31.6 G/DL (ref 32–36)
MCV RBC AUTO: 89.5 FL (ref 80–100)
MONOCYTES # BLD AUTO: 518 CELLS/UL (ref 200–950)
MONOCYTES NFR BLD AUTO: 7.5 %
NEUTROPHILS # BLD AUTO: 4692 CELLS/UL (ref 1500–7800)
NEUTROPHILS NFR BLD AUTO: 68 %
NONHDLC SERPL-MCNC: 86 MG/DL (CALC)
PLATELET # BLD AUTO: 232 THOUSAND/UL (ref 140–400)
PMV BLD REES-ECKER: 10.9 FL (ref 7.5–12.5)
POTASSIUM SERPL-SCNC: 4.1 MMOL/L (ref 3.5–5.3)
PROT SERPL-MCNC: 6.8 G/DL (ref 6.1–8.1)
PSA SERPL-MCNC: 0.54 NG/ML
RBC # BLD AUTO: 4.88 MILLION/UL (ref 4.2–5.8)
SODIUM SERPL-SCNC: 140 MMOL/L (ref 135–146)
TRIGL SERPL-MCNC: 125 MG/DL
TSH SERPL-ACNC: 3.55 MIU/L (ref 0.4–4.5)
WBC # BLD AUTO: 6.9 THOUSAND/UL (ref 3.8–10.8)

## 2025-05-21 ENCOUNTER — OFFICE VISIT (OUTPATIENT)
Dept: PRIMARY CARE | Facility: CLINIC | Age: 65
End: 2025-05-21
Payer: MEDICARE

## 2025-05-21 VITALS
WEIGHT: 247.8 LBS | TEMPERATURE: 98.1 F | BODY MASS INDEX: 34.69 KG/M2 | DIASTOLIC BLOOD PRESSURE: 72 MMHG | HEART RATE: 66 BPM | HEIGHT: 71 IN | OXYGEN SATURATION: 95 % | RESPIRATION RATE: 18 BRPM | SYSTOLIC BLOOD PRESSURE: 128 MMHG

## 2025-05-21 DIAGNOSIS — I10 BENIGN ESSENTIAL HYPERTENSION: Primary | ICD-10-CM

## 2025-05-21 DIAGNOSIS — G47.33 OSA ON CPAP: ICD-10-CM

## 2025-05-21 DIAGNOSIS — E11.42 TYPE 2 DIABETES MELLITUS WITH DIABETIC POLYNEUROPATHY, WITH LONG-TERM CURRENT USE OF INSULIN: ICD-10-CM

## 2025-05-21 DIAGNOSIS — E78.2 COMBINED HYPERLIPIDEMIA: ICD-10-CM

## 2025-05-21 DIAGNOSIS — E06.3 HYPOTHYROIDISM DUE TO HASHIMOTO'S THYROIDITIS: ICD-10-CM

## 2025-05-21 DIAGNOSIS — Z79.4 TYPE 2 DIABETES MELLITUS WITH DIABETIC POLYNEUROPATHY, WITH LONG-TERM CURRENT USE OF INSULIN: ICD-10-CM

## 2025-05-21 DIAGNOSIS — E66.01 MORBID (SEVERE) OBESITY DUE TO EXCESS CALORIES (MULTI): ICD-10-CM

## 2025-05-21 PROCEDURE — 4010F ACE/ARB THERAPY RXD/TAKEN: CPT | Performed by: FAMILY MEDICINE

## 2025-05-21 PROCEDURE — 1036F TOBACCO NON-USER: CPT | Performed by: FAMILY MEDICINE

## 2025-05-21 PROCEDURE — 3074F SYST BP LT 130 MM HG: CPT | Performed by: FAMILY MEDICINE

## 2025-05-21 PROCEDURE — 99214 OFFICE O/P EST MOD 30 MIN: CPT | Performed by: FAMILY MEDICINE

## 2025-05-21 PROCEDURE — 1160F RVW MEDS BY RX/DR IN RCRD: CPT | Performed by: FAMILY MEDICINE

## 2025-05-21 PROCEDURE — 1126F AMNT PAIN NOTED NONE PRSNT: CPT | Performed by: FAMILY MEDICINE

## 2025-05-21 PROCEDURE — 1159F MED LIST DOCD IN RCRD: CPT | Performed by: FAMILY MEDICINE

## 2025-05-21 PROCEDURE — 1124F ACP DISCUSS-NO DSCNMKR DOCD: CPT | Performed by: FAMILY MEDICINE

## 2025-05-21 PROCEDURE — 3008F BODY MASS INDEX DOCD: CPT | Performed by: FAMILY MEDICINE

## 2025-05-21 PROCEDURE — G2211 COMPLEX E/M VISIT ADD ON: HCPCS | Performed by: FAMILY MEDICINE

## 2025-05-21 PROCEDURE — 3078F DIAST BP <80 MM HG: CPT | Performed by: FAMILY MEDICINE

## 2025-05-21 ASSESSMENT — ENCOUNTER SYMPTOMS
OCCASIONAL FEELINGS OF UNSTEADINESS: 0
HYPERTENSION: 1
LOSS OF SENSATION IN FEET: 0
DEPRESSION: 0

## 2025-05-21 ASSESSMENT — LIFESTYLE VARIABLES
HOW OFTEN DO YOU HAVE A DRINK CONTAINING ALCOHOL: NEVER
AUDIT-C TOTAL SCORE: 0
HOW MANY STANDARD DRINKS CONTAINING ALCOHOL DO YOU HAVE ON A TYPICAL DAY: PATIENT DOES NOT DRINK
HOW OFTEN DO YOU HAVE SIX OR MORE DRINKS ON ONE OCCASION: NEVER
SKIP TO QUESTIONS 9-10: 1

## 2025-05-21 ASSESSMENT — PATIENT HEALTH QUESTIONNAIRE - PHQ9
SUM OF ALL RESPONSES TO PHQ9 QUESTIONS 1 & 2: 1
2. FEELING DOWN, DEPRESSED OR HOPELESS: SEVERAL DAYS
1. LITTLE INTEREST OR PLEASURE IN DOING THINGS: NOT AT ALL

## 2025-05-21 ASSESSMENT — PAIN SCALES - GENERAL: PAINLEVEL_OUTOF10: 0-NO PAIN

## 2025-05-21 NOTE — PROGRESS NOTES
"Subjective   Patient ID: Malcom Breaux is a 65 y.o. male who presents for Hypertension.    Here for mediation physical.      For DM2:  -A1c: 7.2  -Follow up:  Wearing freestyle laura 2 - using tresiba and novolog.    Seeing endo next month.  No recent hypoglycemia.    -Hypoglycemic Episodes: None since last visit.  Pt has alarms for hypoglycemia  -Glucose monitoring:  Checking frequently    Hypertension  -Patient is here for follow-up of elevated blood pressure.  No new issues.   -Blood pressure is well controlled.  -Cardiac symptoms: none.   -Patient denies dyspnea and irregular heart beat.   -Cardiologist:    Dyslipidemia  -F/U: On atorvastatin - tolerating well.  Well controlled.   -Compliance with treatment thus far has been good.     Obstructed Sleep Apnea  -F/U: Unable to tolerate CPAP.  Would take it off middle of night.    -Compliance: Tried to wear - could not tolerate  -Response:  Poor  -Specialist: none        Hypertension         Review of Systems    Objective   /72   Pulse 66   Temp 36.7 °C (98.1 °F) (Temporal)   Resp 18   Ht 1.803 m (5' 11\")   Wt 112 kg (247 lb 12.8 oz)   SpO2 95%   BMI 34.56 kg/m²     Physical Exam  Vitals reviewed.   Constitutional:       General: He is not in acute distress.  Cardiovascular:      Rate and Rhythm: Normal rate and regular rhythm.   Pulmonary:      Effort: Pulmonary effort is normal.      Breath sounds: No wheezing or rhonchi.   Musculoskeletal:      Right lower leg: Edema present.      Left lower leg: Edema present.   Lymphadenopathy:      Cervical: No cervical adenopathy.   Neurological:      Mental Status: He is alert.     Colon Cancer Screening Refusal.    -Patient refuses colon cancer screening including FIT testing, Cologuard and colonoscopy. Is aware of the implications of refusal including missed early detection of colorectal cancer which could lead to increased morbidity and mortality.     Assessment/Plan   Diagnoses and all orders for this " visit:  Benign essential hypertension  Morbid (severe) obesity due to excess calories (Multi)  Type 2 diabetes mellitus with diabetic polyneuropathy, with long-term current use of insulin  Combined hyperlipidemia  Hypothyroidism due to Hashimoto's thyroiditis  History of CVA (cerebrovascular accident)  RAFAEL on CPAP    Patient Instructions   Here for follow up.      For diabetes - A1c is 7.4 - seeing endocrinology next month.  Continue laura.      For cholesterol - well controlled    For blood pressure - well controlled.  Continue current regimen    For kidney function - check urine and blood test prior to next visit    For thyroid - TSH in range    Recheck in 6 months - blood work and urine prior to visit.

## 2025-05-21 NOTE — PATIENT INSTRUCTIONS
Here for follow up.      For diabetes - A1c is 7.4 - seeing endocrinology next month.  Continue laura.      For cholesterol - well controlled    For blood pressure - well controlled.  Continue current regimen    For kidney function - check urine and blood test prior to next visit    For thyroid - TSH in range    Recheck in 6 months - blood work and urine prior to visit.

## 2025-06-03 DIAGNOSIS — Z79.4 TYPE 2 DIABETES MELLITUS WITH HYPERGLYCEMIA, WITH LONG-TERM CURRENT USE OF INSULIN: ICD-10-CM

## 2025-06-03 DIAGNOSIS — E11.65 TYPE 2 DIABETES MELLITUS WITH HYPERGLYCEMIA, WITH LONG-TERM CURRENT USE OF INSULIN: ICD-10-CM

## 2025-06-03 RX ORDER — PIOGLITAZONE 15 MG/1
15 TABLET ORAL DAILY
Qty: 90 TABLET | Refills: 1 | Status: SHIPPED | OUTPATIENT
Start: 2025-06-03 | End: 2026-06-03

## 2025-06-20 DIAGNOSIS — E11.65 TYPE 2 DIABETES MELLITUS WITH HYPERGLYCEMIA, WITH LONG-TERM CURRENT USE OF INSULIN: ICD-10-CM

## 2025-06-20 DIAGNOSIS — Z79.4 TYPE 2 DIABETES MELLITUS WITH HYPERGLYCEMIA, WITH LONG-TERM CURRENT USE OF INSULIN: ICD-10-CM

## 2025-06-20 RX ORDER — INSULIN ASPART 100 [IU]/ML
INJECTION, SOLUTION INTRAVENOUS; SUBCUTANEOUS
Qty: 45 ML | Refills: 1 | Status: SHIPPED | OUTPATIENT
Start: 2025-06-20 | End: 2026-06-20

## 2025-06-23 NOTE — PROGRESS NOTES
"HPI   66yo with DM Type 2 diagnosed in his 40s. History Hypothyroidism, taking Levothyroxine 200 mcg, RAFAEL, HTN, HLD, CVA.  A1c- last visit 7.8%, today 7.5%.     Patient testing sugars >4 times a day using laura 2 w/ reader.  Following carb controlled diet somewhat and know reasonable carb allowances. Patient able to afford their medications. Patient is exercising.     Taking Tresiba 28 units, Novolog 10-10-10-0, ISF 25>125 (occ dosing at hs if high), pioglitazone 15mg daily.   -intol metformin,  no glp1 given hx pancreatitis  -SGLT2 no      -taking Losartan 100mg , hydrochlorothiazide 25mg , Metoprolol 100mg bid, Hydralizine 100mg bid, Amlodipine 10mg daily at goal     Taking Atorvastatin 40mg daily for lipids & tolerating.       -30 day laura 2 data reviewed and attached:  51% in range, 2% lows, patterns: upper 100's overnight, waking mid to low 100's, upper 100's mostly through the day, upper low 200's after dinner into hs.  Occ postprandial excursions into low 200 range           Current Outpatient Medications:     amLODIPine (Norvasc) 10 mg tablet, TAKE 1 TABLET BY MOUTH ONE TIME DAILY, Disp: 90 tablet, Rfl: 1    aspirin (ASPIR-81 ORAL), Take 1 tablet by mouth once daily., Disp: , Rfl:     atorvastatin (Lipitor) 40 mg tablet, TAKE 1 TABLET BY MOUTH ONE TIME DAILY, Disp: 90 tablet, Rfl: 1    hydrALAZINE (Apresoline) 100 mg tablet, TAKE 1 TABLET BY MOUTH TWO TIMES A DAY, Disp: 180 tablet, Rfl: 1    hydroCHLOROthiazide (HYDRODiuril) 25 mg tablet, TAKE 1 TABLET BY MOUTH ONE TIME DAILY IN THE MORNING, Disp: 90 tablet, Rfl: 1    insulin aspart (NovoLOG Flexpen U-100 Insulin) 100 unit/mL (3 mL) pen, INJECT SUBCUTANEOUSLY BEFORE MEALS UP TO 50 UNITS DAILY, Disp: 45 mL, Rfl: 1    insulin degludec (Tresiba FlexTouch U-200) 200 unit/mL (3 mL) pen, INJECT SUBCUTANEOUSLY UP TO 50 UNITS DAILY AS DIRECTED, Disp: 27 mL, Rfl: 3    insulin syringe-needle U-100 31G X 5/16\" 1 mL syringe, 2 times a day., Disp: , Rfl:     lancets 33 " "gauge misc, as directed intradermally three times per day for 90 days, Disp: , Rfl:     levothyroxine (Synthroid, Levoxyl) 200 mcg tablet, TAKE 1 TABLET BY MOUTH ONE TIME DAILY IN THE MORNING ON AN EMPTY STOMACH, Disp: 90 tablet, Rfl: 1    losartan (Cozaar) 100 mg tablet, Take 1 tablet (100 mg) by mouth once daily., Disp: 90 tablet, Rfl: 1    meloxicam (Mobic) 15 mg tablet, Take 1 tablet (15 mg) by mouth once daily., Disp: 90 tablet, Rfl: 1    omeprazole (PriLOSEC) 40 mg DR capsule, TAKE 1 CAPSULE BY MOUTH ONE TIME DAILY 30 MINUTES BEFORE BREAKFAST, Disp: 90 capsule, Rfl: 1    pen needle, diabetic 32 gauge x 5/32\" needle, Use 4 times day, Disp: 400 each, Rfl: 2    pioglitazone (Actos) 15 mg tablet, TAKE 1 TABLET BY MOUTH ONE TIME DAILY., Disp: 90 tablet, Rfl: 1    sertraline (Zoloft) 100 mg tablet, TAKE 1 TABLET BY MOUTH TWO TIMES A DAY, Disp: 180 tablet, Rfl: 1    sildenafil (Viagra) 100 mg tablet, Take 1 tablet (100 mg) by mouth once daily as needed., Disp: , Rfl:     metoprolol tartrate (Lopressor) 100 mg tablet, TAKE 1 TABLET BY MOUTH TWO TIMES A DAY, Disp: 180 tablet, Rfl: 1      Allergies as of 06/25/2025 - Reviewed 06/25/2025   Allergen Reaction Noted    Ace inhibitors Cough 09/19/2023    Lisinopril Cough 09/19/2023    Metformin Diarrhea 09/19/2023    Ampicillin Rash and Hives 09/19/2023         Review of Systems   Cardiology: Lightheadedness-denies.  Chest pain-denies.  Leg edema-denies.  Palpitations-denies.  Respiratory: Cough-denies. Shortness of breath-denies.  Wheezing-denies.  Gastroenterology: Constipation-denies.  Diarrhea-denies.  Heartburn-denies.  Endocrinology: Cold intolerance-denies.  Heat intolerance-denies.  Sweats-denies.  Neurology: Headache-denies.  Tremor-denies.  Neuropathy in extremities-denies.  Psychology: Low energy-denies.  Irritability-denies.  Sleep disturbances-denies.      /67   Pulse 64   Ht 1.803 m (5' 11\")   Wt 112 kg (246 lb)   BMI 34.31 kg/m²       Labs:  Lab " "Results   Component Value Date    WBC 6.9 05/14/2025    NRBC 0.0 04/10/2024    RBC 4.88 05/14/2025    HGB 13.8 05/14/2025    HCT 43.7 05/14/2025     05/14/2025     Lab Results   Component Value Date    CALCIUM 10.6 (H) 05/14/2025    AST 13 05/14/2025    ALKPHOS 93 05/14/2025    BILITOT 0.5 05/14/2025    PROT 6.8 05/14/2025    ALBUMIN 4.5 05/14/2025    GLOB 2.6 04/04/2023    AGR 1.7 04/04/2023     05/14/2025    K 4.1 05/14/2025     05/14/2025    CO2 26 05/14/2025    ANIONGAP 11 05/14/2025    BUN 29 (H) 05/14/2025    CREATININE 1.34 05/14/2025    UREACREAUR 20.8 04/04/2023    GLUCOSE 192 (H) 05/14/2025    ALT 12 05/14/2025    EGFR 58 (L) 05/14/2025     Lab Results   Component Value Date    CHOL 131 05/14/2025    TRIG 125 05/14/2025    HDL 45 05/14/2025    LDLCALC 65 05/14/2025     No results found for: \"MICROALBCREA\"  Lab Results   Component Value Date    TSH 3.55 05/14/2025     No results found for: \"HWXTQGPI39\"  Lab Results   Component Value Date    HGBA1C 8.4 (A) 06/25/2025         Physical Exam   General Appearance: pleasant, cooperative, no acute distress  HEENT: no chemosis, no proptosis, no lid lag, no lid retraction  Neck: no lymphadenopathy, no thyromegaly, no dominant thyroid nodules  Heart: no murmurs, regular rate and rhythm, S1 and S2  Lungs: no wheezes, no rhonci, no rales  Extremities: no lower extremity swelling      Assessment/Plan   1. Type 2 diabetes mellitus with hyperglycemia, with long-term current use of insulin (Primary)  -A1c ordered and reviewed  -laura data reviewed  -labs reviewed    -work on carb control in the diet and physical activity  -consider 10-15 units of prandial insulin at meals, target post prandials <180    -no room to increase basal, lower in 2 unit intervals if am sugars <100    -having dilated eye exam next week    2. Mixed hyperlipidemia  -on statin and tolerating, will follow    3. Hypothyroidism due to Hashimoto's thyroiditis  -euthyroid on therapy, " will follow    4. Benign essential hypertension  -at target on therapy, will follow         Follow Up:  pharmD 6 months    Medical Decision Making  Complexity of problem: Chronic illness of diabetes mellitus uncontrolled, progressing  Data analyzed and reviewed: Reviewed prior notes, blood glucose data, labs including HgbA1c, lipids, serum chemistries.  Ordered tests.   Risk of complications and morbidities: Is definite because of use of insulin and risk of hypoglycemia.  Prescription medications reviewed and modifications made.  Compliance assessed.  Addressed social determinants of health including food insecurity.

## 2025-06-25 ENCOUNTER — OFFICE VISIT (OUTPATIENT)
Facility: CLINIC | Age: 65
End: 2025-06-25
Payer: MEDICARE

## 2025-06-25 VITALS
HEIGHT: 71 IN | DIASTOLIC BLOOD PRESSURE: 67 MMHG | BODY MASS INDEX: 34.44 KG/M2 | WEIGHT: 246 LBS | SYSTOLIC BLOOD PRESSURE: 113 MMHG | HEART RATE: 64 BPM

## 2025-06-25 DIAGNOSIS — E78.2 MIXED HYPERLIPIDEMIA: ICD-10-CM

## 2025-06-25 DIAGNOSIS — E06.3 HYPOTHYROIDISM DUE TO HASHIMOTO'S THYROIDITIS: ICD-10-CM

## 2025-06-25 DIAGNOSIS — Z79.4 TYPE 2 DIABETES MELLITUS WITH HYPERGLYCEMIA, WITH LONG-TERM CURRENT USE OF INSULIN: Primary | ICD-10-CM

## 2025-06-25 DIAGNOSIS — E11.65 TYPE 2 DIABETES MELLITUS WITH HYPERGLYCEMIA, WITH LONG-TERM CURRENT USE OF INSULIN: Primary | ICD-10-CM

## 2025-06-25 DIAGNOSIS — I10 BENIGN ESSENTIAL HYPERTENSION: ICD-10-CM

## 2025-06-25 LAB — POC HEMOGLOBIN A1C: 8.4 % (ref 4.2–6.5)

## 2025-06-25 PROCEDURE — 3074F SYST BP LT 130 MM HG: CPT | Performed by: INTERNAL MEDICINE

## 2025-06-25 PROCEDURE — 99214 OFFICE O/P EST MOD 30 MIN: CPT | Performed by: INTERNAL MEDICINE

## 2025-06-25 PROCEDURE — 3052F HG A1C>EQUAL 8.0%<EQUAL 9.0%: CPT | Performed by: INTERNAL MEDICINE

## 2025-06-25 PROCEDURE — 4010F ACE/ARB THERAPY RXD/TAKEN: CPT | Performed by: INTERNAL MEDICINE

## 2025-06-25 PROCEDURE — 1159F MED LIST DOCD IN RCRD: CPT | Performed by: INTERNAL MEDICINE

## 2025-06-25 PROCEDURE — 3008F BODY MASS INDEX DOCD: CPT | Performed by: INTERNAL MEDICINE

## 2025-06-25 PROCEDURE — 83036 HEMOGLOBIN GLYCOSYLATED A1C: CPT | Mod: QW | Performed by: INTERNAL MEDICINE

## 2025-06-25 PROCEDURE — 3078F DIAST BP <80 MM HG: CPT | Performed by: INTERNAL MEDICINE

## 2025-06-25 PROCEDURE — 1125F AMNT PAIN NOTED PAIN PRSNT: CPT | Performed by: INTERNAL MEDICINE

## 2025-06-25 PROCEDURE — 1036F TOBACCO NON-USER: CPT | Performed by: INTERNAL MEDICINE

## 2025-06-25 ASSESSMENT — PATIENT HEALTH QUESTIONNAIRE - PHQ9
SUM OF ALL RESPONSES TO PHQ9 QUESTIONS 1 & 2: 0
2. FEELING DOWN, DEPRESSED OR HOPELESS: NOT AT ALL
1. LITTLE INTEREST OR PLEASURE IN DOING THINGS: NOT AT ALL

## 2025-06-25 ASSESSMENT — PAIN SCALES - GENERAL: PAINLEVEL_OUTOF10: 2

## 2025-06-25 ASSESSMENT — ENCOUNTER SYMPTOMS
DEPRESSION: 1
LOSS OF SENSATION IN FEET: 0
OCCASIONAL FEELINGS OF UNSTEADINESS: 0

## 2025-06-25 ASSESSMENT — LIFESTYLE VARIABLES
HOW OFTEN DO YOU HAVE A DRINK CONTAINING ALCOHOL: NEVER
HOW MANY STANDARD DRINKS CONTAINING ALCOHOL DO YOU HAVE ON A TYPICAL DAY: PATIENT DOES NOT DRINK
HOW OFTEN DO YOU HAVE SIX OR MORE DRINKS ON ONE OCCASION: NEVER
SKIP TO QUESTIONS 9-10: 1
AUDIT-C TOTAL SCORE: 0

## 2025-07-14 DIAGNOSIS — E78.2 COMBINED HYPERLIPIDEMIA: ICD-10-CM

## 2025-07-14 RX ORDER — ATORVASTATIN CALCIUM 40 MG/1
40 TABLET, FILM COATED ORAL DAILY
Qty: 90 TABLET | Refills: 3 | Status: SHIPPED | OUTPATIENT
Start: 2025-07-14 | End: 2026-07-14

## 2025-07-30 ENCOUNTER — TELEPHONE (OUTPATIENT)
Dept: PRIMARY CARE | Facility: CLINIC | Age: 65
End: 2025-07-30
Payer: MEDICARE

## 2025-08-06 PROCEDURE — RXMED WILLOW AMBULATORY MEDICATION CHARGE

## 2025-08-11 ENCOUNTER — PHARMACY VISIT (OUTPATIENT)
Dept: PHARMACY | Facility: CLINIC | Age: 65
End: 2025-08-11
Payer: COMMERCIAL

## 2025-08-18 DIAGNOSIS — E11.65 TYPE 2 DIABETES MELLITUS WITH HYPERGLYCEMIA, WITH LONG-TERM CURRENT USE OF INSULIN: ICD-10-CM

## 2025-08-18 DIAGNOSIS — Z79.4 TYPE 2 DIABETES MELLITUS WITH HYPERGLYCEMIA, WITH LONG-TERM CURRENT USE OF INSULIN: ICD-10-CM

## 2025-08-18 RX ORDER — INSULIN DEGLUDEC 200 U/ML
INJECTION, SOLUTION SUBCUTANEOUS
Qty: 27 ML | Refills: 3 | Status: SHIPPED | OUTPATIENT
Start: 2025-08-18 | End: 2026-08-18